# Patient Record
Sex: FEMALE | Race: BLACK OR AFRICAN AMERICAN | NOT HISPANIC OR LATINO | Employment: UNEMPLOYED | URBAN - METROPOLITAN AREA
[De-identification: names, ages, dates, MRNs, and addresses within clinical notes are randomized per-mention and may not be internally consistent; named-entity substitution may affect disease eponyms.]

---

## 2017-03-20 ENCOUNTER — TELEPHONE (OUTPATIENT)
Dept: ORTHOPEDICS | Facility: CLINIC | Age: 47
End: 2017-03-20

## 2017-03-20 NOTE — TELEPHONE ENCOUNTER
Spoke with patient and gave her to the appointment desk to schedule an appointment and update her information.

## 2017-03-20 NOTE — TELEPHONE ENCOUNTER
----- Message from Jocelynn Suazo sent at 3/20/2017  8:26 AM CDT -----  Pt is requesting a call from nurse to discuss an apt for her knee pain. Pt states she has MEDICAID.          Please call pt back at [phone]

## 2017-05-18 ENCOUNTER — OFFICE VISIT (OUTPATIENT)
Dept: OBSTETRICS AND GYNECOLOGY | Facility: CLINIC | Age: 47
End: 2017-05-18
Payer: MEDICAID

## 2017-05-18 ENCOUNTER — HOSPITAL ENCOUNTER (OUTPATIENT)
Dept: RADIOLOGY | Facility: HOSPITAL | Age: 47
Discharge: HOME | End: 2017-05-18
Attending: NURSE PRACTITIONER
Payer: MEDICAID

## 2017-05-18 VITALS
HEIGHT: 64 IN | WEIGHT: 196.19 LBS | BODY MASS INDEX: 33.49 KG/M2 | SYSTOLIC BLOOD PRESSURE: 116 MMHG | DIASTOLIC BLOOD PRESSURE: 72 MMHG

## 2017-05-18 DIAGNOSIS — Z12.31 ENCOUNTER FOR SCREENING MAMMOGRAM FOR BREAST CANCER: ICD-10-CM

## 2017-05-18 DIAGNOSIS — N92.6 IRREGULAR MENSES: ICD-10-CM

## 2017-05-18 DIAGNOSIS — Z01.419 ENCOUNTER FOR GYNECOLOGICAL EXAMINATION WITHOUT ABNORMAL FINDING: Primary | ICD-10-CM

## 2017-05-18 PROCEDURE — 77067 SCR MAMMO BI INCL CAD: CPT | Mod: 26,,, | Performed by: RADIOLOGY

## 2017-05-18 PROCEDURE — 88175 CYTOPATH C/V AUTO FLUID REDO: CPT

## 2017-05-18 PROCEDURE — 77067 SCR MAMMO BI INCL CAD: CPT | Mod: TC

## 2017-05-18 PROCEDURE — 99999 PR PBB SHADOW E&M-EST. PATIENT-LVL III: CPT | Mod: PBBFAC,,, | Performed by: NURSE PRACTITIONER

## 2017-05-18 PROCEDURE — 99386 PREV VISIT NEW AGE 40-64: CPT | Mod: S$PBB,,, | Performed by: NURSE PRACTITIONER

## 2017-05-18 NOTE — PROGRESS NOTES
"CC: Well woman exam    Tara Encinas is a 46 y.o. female  presents for well woman exam.  LMP: Patient's last menstrual period was 2017 (approximate)..    At times skip cycles off and on past 2 years started after having tubal pregnancy. She did loss her fallopian tube with the ectopic.  She would still like pregnancy.     Past Medical History:   Diagnosis Date    Tubal ectopic pregnancy      Past Surgical History:   Procedure Laterality Date     SECTION      x 3    EYE SURGERY      Left eye    FINGER SURGERY      right hand (index finger)    KNEE SURGERY      bilateral     Social History     Social History    Marital status: Single     Spouse name: N/A    Number of children: N/A    Years of education: N/A     Occupational History    Not on file.     Social History Main Topics    Smoking status: Never Smoker    Smokeless tobacco: Not on file    Alcohol use No    Drug use: No    Sexual activity: Yes     Partners: Male     Birth control/ protection: None     Other Topics Concern    Not on file     Social History Narrative    No narrative on file     Family History   Problem Relation Age of Onset    Diabetes Paternal Grandmother     Breast cancer Neg Hx     Cancer Neg Hx     Colon cancer Neg Hx     Ovarian cancer Neg Hx     Miscarriages / Stillbirths Neg Hx     Eclampsia Neg Hx     Hypertension Neg Hx      labor Neg Hx     Stroke Neg Hx      OB History      Para Term  AB TAB SAB Ectopic Multiple Living    6 3 3  3 2  1            /72  Ht 5' 4" (1.626 m)  Wt 89 kg (196 lb 3.2 oz)  LMP 2017 (Approximate)  BMI 33.68 kg/m2      ROS:  GENERAL: Denies weight gain or weight loss. Feeling well overall.   SKIN: Denies rash or lesions.   HEAD: Denies head injury or headache.   NODES: Denies enlarged lymph nodes.   CHEST: Denies chest pain or shortness of breath.   CARDIOVASCULAR: Denies palpitations or left sided chest pain.   ABDOMEN: No " abdominal pain, constipation, diarrhea, nausea, vomiting or rectal bleeding.   URINARY: No frequency, dysuria, hematuria, or burning on urination.  REPRODUCTIVE: See HPI.   BREASTS: The patient performs breast self-examination and denies pain, lumps, or nipple discharge.   HEMATOLOGIC: No easy bruisability or excessive bleeding.   MUSCULOSKELETAL: Denies joint pain or swelling.   NEUROLOGIC: Denies syncope or weakness.   PSYCHIATRIC: Denies depression, anxiety or mood swings.    PHYSICAL EXAM:  APPEARANCE: Well nourished, well developed, in no acute distress.  AFFECT: WNL, alert and oriented x 3  SKIN: No acne or hirsutism  NECK: Neck symmetric without masses or thyromegaly  NODES: No inguinal, cervical, axillary, or femoral lymph node enlargement  CHEST: Good respiratory effect  ABDOMEN: Soft.  No tenderness or masses.  No hepatosplenomegaly.  No hernias.  BREASTS: Symmetrical, no skin changes or visible lesions.  No palpable masses, nipple discharge bilaterally.  PELVIC: Normal external genitalia without lesions.  Normal hair distribution.  Adequate perineal body, normal urethral meatus.  Vagina moist and well rugated without lesions or discharge.  Cervix pink, without lesions, discharge or tenderness.  No significant cystocele or rectocele.  Bimanual exam shows uterus to be normal size, regular, mobile and nontender.  Adnexa without masses or tenderness.    EXTREMITIES: No edema.  Physical Exam    1. Encounter for gynecological examination without abnormal finding  Liquid-based pap smear, screening   2. Encounter for screening mammogram for breast cancer  Mammo Digital Screening Bilat with CAD   3. Irregular menses      AND PLAN:    Patient was counseled today on A.C.S. Pap guidelines and recommendations for yearly pelvic exams, mammograms and monthly self breast exams; to see her PCP for other health maintenance.     See GYN MD to discuss fertility issues   Prenatal vitamins

## 2017-05-18 NOTE — MR AVS SNAPSHOT
[facility] - OB/ GYN  [address] [place] [address]  Phone: [phone]  Fax: [phone]                  Tara Encinas   2017 10:30 AM   Office Visit    Description:  Female : 1970   Provider:  Nury Ho NP   Department:  [facility] - [facility]/ [facility]           Reason for Visit     Annual Exam     Menstrual Problem           Diagnoses this Visit        Comments    Encounter for gynecological examination without abnormal finding    -  Primary     Encounter for screening mammogram for breast cancer         Irregular menses                To Do List           Future Appointments        Provider Department Dept Phone    2017 12:45 PM [facility] MAMMO1-SCR [facility] [phone]    2017 10:45 AM Eric Velasco MD [facility] OB/ -[phone]      Goals (5 Years of Data)     None      Follow-Up and Disposition     Return in about 1 year (around 2018).      [facility]     [facility] Nurse Care Line -  Assistance  Unless otherwise directed by your provider, please contact [facility], our nurse care line that is available for  assistance.     Registered nurses in the [facility] provide: appointment scheduling, clinical advisement, health education, and other advisory services.  Call: [phone] (toll free)               Medications           Message regarding Medications     Verify the changes and/or additions to your medication regime listed below are the same as discussed with your clinician today.  If any of these changes or additions are incorrect, please notify your healthcare provider.             Verify that the below list of medications is an accurate representation of the medications you are currently taking.  If none reported, the list may be blank. If incorrect, please contact your healthcare provider. Carry this list with you in case of emergency.                Clinical Reference Information           Your Vitals Were   "   BP Height Weight Last Period BMI    116/72 5' 4" (1.626 m) 89 kg (196 lb 3.2 oz) 04/26/2017 (Approximate) 33.68 kg/m2      Blood Pressure          Most Recent Value    BP  116/72      Allergies as of 5/18/2017     No Known Allergies      Immunizations Administered on Date of Encounter - 5/18/2017     None      Orders Placed During Today's Visit      Normal Orders This Visit    Liquid-based pap smear, screening     Future Labs/Procedures Expected by Expires    Mammo Digital Screening Bilat with CAD  5/18/2017 7/18/2018      Language Assistance Services     ATTENTION: Language assistance services are available, free of charge. Please call [phone].      ATENCIÓN: Si habla sandra, tiene a franklin disposición servicios gratuitos de asistencia lingüística. Llame al [phone].     CHÚ Ý: N?u b?n nói Ti?ng Vi?t, có các d?ch v? h? tr? ngôn ng? mi?n phí dành cho b?n. G?i s? [phone].         [facility] complies with applicable Federal civil rights laws and does not discriminate on the basis of race, color, national origin, age, disability, or sex.        "

## 2017-05-26 ENCOUNTER — PATIENT MESSAGE (OUTPATIENT)
Dept: OBSTETRICS AND GYNECOLOGY | Facility: CLINIC | Age: 47
End: 2017-05-26

## 2017-06-07 ENCOUNTER — OFFICE VISIT (OUTPATIENT)
Dept: OBSTETRICS AND GYNECOLOGY | Facility: CLINIC | Age: 47
End: 2017-06-07
Payer: MEDICAID

## 2017-06-07 VITALS
SYSTOLIC BLOOD PRESSURE: 122 MMHG | WEIGHT: 192.25 LBS | DIASTOLIC BLOOD PRESSURE: 72 MMHG | HEIGHT: 59 IN | BODY MASS INDEX: 38.76 KG/M2

## 2017-06-07 DIAGNOSIS — N94.6 DYSMENORRHEA: ICD-10-CM

## 2017-06-07 DIAGNOSIS — Z87.59 HISTORY OF ECTOPIC PREGNANCY: ICD-10-CM

## 2017-06-07 DIAGNOSIS — E66.9 OBESITY (BMI 35.0-39.9 WITHOUT COMORBIDITY): ICD-10-CM

## 2017-06-07 DIAGNOSIS — N91.5 OLIGOMENORRHEA: ICD-10-CM

## 2017-06-07 PROCEDURE — 99212 OFFICE O/P EST SF 10 MIN: CPT | Mod: PBBFAC,PO | Performed by: OBSTETRICS & GYNECOLOGY

## 2017-06-07 PROCEDURE — 99213 OFFICE O/P EST LOW 20 MIN: CPT | Mod: S$PBB,,, | Performed by: OBSTETRICS & GYNECOLOGY

## 2017-06-07 PROCEDURE — 99999 PR PBB SHADOW E&M-EST. PATIENT-LVL II: CPT | Mod: PBBFAC,,, | Performed by: OBSTETRICS & GYNECOLOGY

## 2017-06-07 RX ORDER — FLUTICASONE PROPIONATE 220 UG/1
AEROSOL, METERED RESPIRATORY (INHALATION)
COMMUNITY
Start: 2017-04-19 | End: 2017-07-31

## 2017-06-07 NOTE — LETTER
June 7, 2017      Nury Ho, NP  [address] [place]  [place] [address]           [facility] - OB/ GYN  [address]  [place] [address]  Phone: [phone]  Fax: [phone]          Patient: Tara Encinas   MR Number: [id number]   YOB: 1970   Date of Visit: 6/7/2017       Dear Nury Ho:    Thank you for referring Tara Encinas to me for evaluation. Attached you will find relevant portions of my assessment and plan of care.    If you have questions, please do not hesitate to call me. I look forward to following Tara Encinas along with you.    Sincerely,    Eric Velasco MD    Enclosure  CC:  No Recipients    If you would like to receive this communication electronically, please contact [email or web address] or [phone] to request more information on [facility] access.    For providers and/or their staff who would like to refer a patient to [facility], please contact us through our one-stop-shop provider referral line, [facility], at [phone].    If you feel you have received this communication in error or would no longer like to receive these types of communications, please e-mail [email or web address]

## 2017-06-07 NOTE — PROGRESS NOTES
"Tara Encinas is a 46 y.o.  who presents for   Chief Complaint   Patient presents with    Infertility     c/o trying to conceive x 7 months; reports hx of ectopic pregnancy 2 yrs ago (emerg surgery done at ).      Patient's last menstrual period was 2017 (exact date).     Periods are not regular q month, "late a lot."   + dysmenorrhea, heavy with clots.   - no dyspareunia    Pt is sexually active - mut monog - uses nothing for contraception.   - her SO only has 1 child and wants another    No hx of abnormal paps. Last pap was normal on 17.  Last mammogram was normal on 17.    Past Medical History:   Diagnosis Date    Asthma     Tubal ectopic pregnancy        Past Surgical History:   Procedure Laterality Date     SECTION      x 3    ECTOPIC PREGNANCY SURGERY      EYE SURGERY      Left eye    FINGER SURGERY      right hand (index finger)    KNEE SURGERY      bilateral       Current Outpatient Prescriptions   Medication Sig Dispense Refill    FLOVENT  mcg/actuation inhaler        No current facility-administered medications for this visit.           Review of patient's allergies indicates:  No Known Allergies    .  Family History   Problem Relation Age of Onset    Diabetes Paternal Grandmother     Breast cancer Neg Hx     Colon cancer Neg Hx     Ovarian cancer Neg Hx     Thrombosis Neg Hx        Social History   Substance Use Topics    Smoking status: Never Smoker    Smokeless tobacco: Not on file    Alcohol use Yes      Comment: socially       /72   Ht 4' 11" (1.499 m)   Wt 87.2 kg (192 lb 3.9 oz)   LMP 2017 (Exact Date)   BMI 38.83 kg/m²     ROS:  GENERAL: No acute complaints.   BREASTS: No lumps, tenderness, discharge.  GI: No nausea, vomiting, melena, hematochezia.  : No dysuria, hematuria. No significant urinary incontinence.      Nl PE on 17 w Ms [place]    GEN:  Pleasant obese lady in no acute distress.  HEAD and NECK: " Normocephalic. Normal thyroid to inspection   SKIN: Min fascial hirsutism or acne    IMPRESSION: obesity, suboptimal fertility, dysmenorrhea, hx of ectopic      COUNSELING:  - Long talk re age, decreasing fecundity, increased risks of chromosomal abnormalities, and her increased risk of an ectopic again  - discussed pos adhesions from her prior 3 C/S's and her salpingectomy w her ectopic (in 2015)  - discussed cost and probable lack of coverage with her current insurance and rec she check before any big W/U  - also discussed her obesity and increased risk of an/oligo-ovulation and decreased fertility    - talk re pt's weight and the other health implications associated w obesity (increased risks of thrombosis, DM, HTN, renal disease, lipid issues, etc) - encourage regular exercise (30-60 min 5 times/wk) but stressed the importance of diet in actual weight control and recommend Weight Watchers, Turning [employer] program or any other organized program along with increased activity/exercise. Suggest a target of 5-10 % wt reduction over 6-12 months as a goal.    - trial of premenstrual aleve for the hypermen and dysmenorrhea    PLAN: consider her options - work on wt !

## 2017-07-18 ENCOUNTER — TELEPHONE (OUTPATIENT)
Dept: OBSTETRICS AND GYNECOLOGY | Facility: CLINIC | Age: 47
End: 2017-07-18

## 2017-07-18 NOTE — TELEPHONE ENCOUNTER
"Pt c/o bleeding with clots and cramps since 6/20/17, was seen by PCP today and rx'd "something to stop the bleeding until I can get in to see the doctor."  Appt made for 7/31/17 at 11am with Nury per pt request.  Pt voiced understanding.  ARUN, LPN  "

## 2017-07-18 NOTE — TELEPHONE ENCOUNTER
----- Message from Neris Ortiz sent at 7/18/2017  8:16 AM CDT -----  Contact: Pt   States she's calling to be worked in today with someone for an ongoing cycle says for over a mnth and can be reached at [phone]//thanks/dbw

## 2017-07-31 ENCOUNTER — LAB VISIT (OUTPATIENT)
Dept: LAB | Facility: HOSPITAL | Age: 47
End: 2017-07-31
Attending: NURSE PRACTITIONER
Payer: MEDICAID

## 2017-07-31 ENCOUNTER — OFFICE VISIT (OUTPATIENT)
Dept: OBSTETRICS AND GYNECOLOGY | Facility: CLINIC | Age: 47
End: 2017-07-31
Payer: MEDICAID

## 2017-07-31 VITALS
HEIGHT: 59 IN | BODY MASS INDEX: 39.11 KG/M2 | WEIGHT: 194 LBS | DIASTOLIC BLOOD PRESSURE: 76 MMHG | SYSTOLIC BLOOD PRESSURE: 124 MMHG

## 2017-07-31 DIAGNOSIS — N92.1 MENORRHAGIA WITH IRREGULAR CYCLE: ICD-10-CM

## 2017-07-31 DIAGNOSIS — N92.1 MENORRHAGIA WITH IRREGULAR CYCLE: Primary | ICD-10-CM

## 2017-07-31 LAB
BASOPHILS # BLD AUTO: 0.03 K/UL
BASOPHILS NFR BLD: 0.3 %
DIFFERENTIAL METHOD: ABNORMAL
EOSINOPHIL # BLD AUTO: 0.3 K/UL
EOSINOPHIL NFR BLD: 3.2 %
ERYTHROCYTE [DISTWIDTH] IN BLOOD BY AUTOMATED COUNT: 16.4 %
HCT VFR BLD AUTO: 24 %
HGB BLD-MCNC: 7.1 G/DL
LYMPHOCYTES # BLD AUTO: 3 K/UL
LYMPHOCYTES NFR BLD: 28.7 %
MCH RBC QN AUTO: 23.2 PG
MCHC RBC AUTO-ENTMCNC: 29.6 G/DL
MCV RBC AUTO: 78 FL
MONOCYTES # BLD AUTO: 0.6 K/UL
MONOCYTES NFR BLD: 5.7 %
NEUTROPHILS # BLD AUTO: 6.4 K/UL
NEUTROPHILS NFR BLD: 61.4 %
PLATELET # BLD AUTO: 655 K/UL
PMV BLD AUTO: 9.3 FL
RBC # BLD AUTO: 3.06 M/UL
TSH SERPL DL<=0.005 MIU/L-ACNC: 3.53 UIU/ML
WBC # BLD AUTO: 10.47 K/UL

## 2017-07-31 PROCEDURE — 85025 COMPLETE CBC W/AUTO DIFF WBC: CPT

## 2017-07-31 PROCEDURE — 99214 OFFICE O/P EST MOD 30 MIN: CPT | Mod: 25,S$PBB,, | Performed by: NURSE PRACTITIONER

## 2017-07-31 PROCEDURE — 58100 BIOPSY OF UTERUS LINING: CPT | Mod: S$PBB,,, | Performed by: NURSE PRACTITIONER

## 2017-07-31 PROCEDURE — 84443 ASSAY THYROID STIM HORMONE: CPT

## 2017-07-31 PROCEDURE — 99999 PR PBB SHADOW E&M-EST. PATIENT-LVL III: CPT | Mod: PBBFAC,,, | Performed by: NURSE PRACTITIONER

## 2017-07-31 PROCEDURE — 36415 COLL VENOUS BLD VENIPUNCTURE: CPT | Mod: PO

## 2017-07-31 PROCEDURE — 88305 TISSUE EXAM BY PATHOLOGIST: CPT | Performed by: PATHOLOGY

## 2017-07-31 PROCEDURE — 88305 TISSUE EXAM BY PATHOLOGIST: CPT | Mod: 26,,, | Performed by: PATHOLOGY

## 2017-07-31 RX ORDER — OMEPRAZOLE 40 MG/1
40 CAPSULE, DELAYED RELEASE ORAL DAILY
COMMUNITY
End: 2018-02-09

## 2017-07-31 NOTE — PROGRESS NOTES
"  Tara Encinas is a 46 y.o. female  presents for heavy menses.  States she has been bleeding since in  with clots.  Saw her pcp Dr. Eisenberg on 17 and was given provera - which she is still taking and it has helped the bleeding.  Now just some spotting at times.   Pt denies sexual activity since in May.     LMP: No LMP recorded..      Past Medical History:   Diagnosis Date    Asthma     Tubal ectopic pregnancy      Past Surgical History:   Procedure Laterality Date     SECTION      x 3    ECTOPIC PREGNANCY SURGERY      EYE SURGERY      Left eye    FINGER SURGERY      right hand (index finger)    KNEE SURGERY      bilateral     Social History     Social History    Marital status: Single     Spouse name: N/A    Number of children: N/A    Years of education: N/A     Occupational History    Not on file.     Social History Main Topics    Smoking status: Never Smoker    Smokeless tobacco: Never Used    Alcohol use Yes      Comment: socially    Drug use: No    Sexual activity: Yes     Partners: Male     Birth control/ protection: None     Other Topics Concern    Not on file     Social History Narrative    No narrative on file     Family History   Problem Relation Age of Onset    Diabetes Paternal Grandmother     Breast cancer Neg Hx     Colon cancer Neg Hx     Ovarian cancer Neg Hx     Thrombosis Neg Hx      OB History      Para Term  AB Living    6 3 3   3 3    SAB TAB Ectopic Multiple Live Births      2 1   3          /76   Ht 4' 11" (1.499 m)   Wt 88 kg (194 lb)   BMI 39.18 kg/m²       ROS:  Per hpi    PHYSICAL EXAM:  APPEARANCE: Well nourished, well developed, in no acute distress.  AFFECT: WNL, alert and oriented x 3  PELVIC: Normal external genitalia without lesions.  Normal hair distribution.  Adequate perineal body, normal urethral meatus.  Vagina moist and well rugated without lesions, creamy tan discharge.  Cervix pink, without lesions, " discharge or tenderness.  No significant cystocele or rectocele.  Bimanual exam shows uterus to be normal size, regular, mobile and nontender.  Adnexa without masses or tenderness.    EXTREMITIES: No edema.  Physical Exam       Procedure Details    A speculum was placed and cervix was prepped with betadine. A sharp tenaculum was applied to the cervix for stabilization. A pipelle was used to sample the endometrium. Uterus sounded to 10. Sample was sent for pathologic examination.    A copious amount of tissue was obtained. Patient tolerated procedure well.        Complications:  None          1. Menorrhagia with irregular cycle  US Pelvis Comp with Transvag NON-OB (xpd    Tissue Specimen To Pathology, Obstetrics/Gynecology    Endometrial biopsy    CBC auto differential    TSH    POCT urine pregnancy    AND PLAN:    upt was negative in office proceed with EMB  Set up pelvic us and labs  Fu with Dr. Velasco   The patient was advised to call for any fever or for prolonged or severe pain or bleeding. She was advised to use OTC analgesics as needed for mild to moderate pain. Patient instructed to call office for results in 1 to 2 weeks.

## 2017-08-01 DIAGNOSIS — D50.0 IRON DEFICIENCY ANEMIA DUE TO CHRONIC BLOOD LOSS: Primary | ICD-10-CM

## 2017-08-01 DIAGNOSIS — N92.1 MENORRHAGIA WITH IRREGULAR CYCLE: ICD-10-CM

## 2017-08-01 RX ORDER — FERROUS SULFATE 325(65) MG
325 TABLET, DELAYED RELEASE (ENTERIC COATED) ORAL 2 TIMES DAILY
Qty: 60 TABLET | Refills: 3 | Status: SHIPPED | OUTPATIENT
Start: 2017-08-01 | End: 2018-04-12

## 2017-08-07 ENCOUNTER — PATIENT MESSAGE (OUTPATIENT)
Dept: OBSTETRICS AND GYNECOLOGY | Facility: CLINIC | Age: 47
End: 2017-08-07

## 2017-08-08 ENCOUNTER — TELEPHONE (OUTPATIENT)
Dept: RADIOLOGY | Facility: HOSPITAL | Age: 47
End: 2017-08-08

## 2017-08-09 ENCOUNTER — TELEPHONE (OUTPATIENT)
Dept: OBSTETRICS AND GYNECOLOGY | Facility: CLINIC | Age: 47
End: 2017-08-09

## 2017-08-09 ENCOUNTER — HOSPITAL ENCOUNTER (OUTPATIENT)
Dept: RADIOLOGY | Facility: HOSPITAL | Age: 47
Discharge: HOME | End: 2017-08-09
Attending: NURSE PRACTITIONER
Payer: MEDICAID

## 2017-08-09 DIAGNOSIS — N92.1 MENORRHAGIA WITH IRREGULAR CYCLE: ICD-10-CM

## 2017-08-09 NOTE — TELEPHONE ENCOUNTER
----- Message from Radha Sanchez sent at 8/9/2017 10:42 AM CDT -----  Patient would like to have a call back from the nurse she had a pelvic ultrasound scheduled today  didn't drink enough water, it was rescheduled. Patient is complaining of heavy bleeding with clots and feels weak, changing pads every 15 minutes and would like something called in to the pharmacy to control the bleeding, please advise patient she can be reached at [phone]// thanks//dd

## 2017-08-09 NOTE — TELEPHONE ENCOUNTER
"Pt c/o bleeding, changing pad every 15-20 minutes, feeling weak, "but not like I need to go to the hospital."  Pt states PCP rx'd provera with no relief.  U/S for today was r/s "because I didn't drink enough water."  Pt requests medication to stop bleeding.  Dr. Velasco spoke to pt and she agreed to go to ER at [facility] now for evaluation.  ARUN, LPN  "

## 2017-08-10 ENCOUNTER — TELEPHONE (OUTPATIENT)
Dept: OBSTETRICS AND GYNECOLOGY | Facility: CLINIC | Age: 47
End: 2017-08-10

## 2017-08-10 NOTE — TELEPHONE ENCOUNTER
----- Message from Vanda Humphries sent at 8/8/2017  3:42 PM CDT -----  Contact: Pt  Pt called and stated she needed to speak to the nurse. She stated she has started bleeding again and needs something called in to her pharmacy.    [facility] [address] [place] [address] [address] [facility]  [address] [facility] [address]  Phone: [phone] Fax: [phone]    She can be reached at [phone] (home)     Thanks,  TF

## 2017-08-15 ENCOUNTER — TELEPHONE (OUTPATIENT)
Dept: RADIOLOGY | Facility: HOSPITAL | Age: 47
End: 2017-08-15

## 2017-08-22 ENCOUNTER — TELEPHONE (OUTPATIENT)
Dept: RADIOLOGY | Facility: HOSPITAL | Age: 47
End: 2017-08-22

## 2017-08-23 ENCOUNTER — PATIENT MESSAGE (OUTPATIENT)
Dept: OBSTETRICS AND GYNECOLOGY | Facility: CLINIC | Age: 47
End: 2017-08-23

## 2017-08-23 ENCOUNTER — HOSPITAL ENCOUNTER (OUTPATIENT)
Dept: RADIOLOGY | Facility: HOSPITAL | Age: 47
Discharge: HOME | End: 2017-08-23
Attending: NURSE PRACTITIONER
Payer: MEDICAID

## 2017-08-23 PROCEDURE — 76856 US EXAM PELVIC COMPLETE: CPT | Mod: 26,,, | Performed by: RADIOLOGY

## 2017-08-23 PROCEDURE — 76856 US EXAM PELVIC COMPLETE: CPT | Mod: TC,PO

## 2017-08-23 PROCEDURE — 76830 TRANSVAGINAL US NON-OB: CPT | Mod: 26,,, | Performed by: RADIOLOGY

## 2017-12-19 ENCOUNTER — TELEPHONE (OUTPATIENT)
Dept: OBSTETRICS AND GYNECOLOGY | Facility: CLINIC | Age: 47
End: 2017-12-19

## 2017-12-19 DIAGNOSIS — N92.6 IRREGULAR MENSES: ICD-10-CM

## 2017-12-19 DIAGNOSIS — N92.1 MENORRHAGIA WITH IRREGULAR CYCLE: Primary | ICD-10-CM

## 2017-12-19 DIAGNOSIS — N94.6 DYSMENORRHEA: ICD-10-CM

## 2017-12-19 RX ORDER — MEDROXYPROGESTERONE ACETATE 10 MG/1
TABLET ORAL
Qty: 30 TABLET | Refills: 0 | Status: SHIPPED | OUTPATIENT
Start: 2017-12-19 | End: 2018-01-30 | Stop reason: SDUPTHER

## 2017-12-19 NOTE — PROGRESS NOTES
Failed to show for her appt with Dr. Velasco, calling today still with bleeding, provera has helped in past, will send in for one month and she needs to make and keep appt with gyn MD - pt was instructed on these orders by Melvin LUNA

## 2017-12-19 NOTE — TELEPHONE ENCOUNTER
Spoke with pt about ultrasound follow up appt. appt has been scheduled for 2/9/18. Pt requested to be put on wait list. She has been added and requested for something to be called in for bleeding until appt. Spoke with Ms Nury and she called in a rx. Informed pt that medication has been called in. Pt verbalized understanding.

## 2017-12-19 NOTE — TELEPHONE ENCOUNTER
----- Message from Leni Hayward sent at 12/19/2017  9:25 AM CST -----  Contact: pt   Call pt regarding her ultrasound results. Pt states that she left numerous of message and nobody called her back with her results.   [phone] (home)

## 2018-01-30 ENCOUNTER — TELEPHONE (OUTPATIENT)
Dept: OBSTETRICS AND GYNECOLOGY | Facility: CLINIC | Age: 48
End: 2018-01-30

## 2018-01-30 DIAGNOSIS — N92.1 MENORRHAGIA WITH IRREGULAR CYCLE: ICD-10-CM

## 2018-01-30 DIAGNOSIS — N92.6 IRREGULAR MENSES: ICD-10-CM

## 2018-01-30 DIAGNOSIS — N94.6 DYSMENORRHEA: ICD-10-CM

## 2018-01-30 RX ORDER — MEDROXYPROGESTERONE ACETATE 10 MG/1
TABLET ORAL
Qty: 30 TABLET | Refills: 0 | Status: SHIPPED | OUTPATIENT
Start: 2018-01-30 | End: 2018-02-09 | Stop reason: ALTCHOICE

## 2018-01-30 NOTE — TELEPHONE ENCOUNTER
Pt. States that she is still bleeding very heavily. She sees Dr. Acosta on 2/9/18 but wants to know if you will call in Rx again to help with the bleeding until she sees him.

## 2018-01-30 NOTE — TELEPHONE ENCOUNTER
----- Message from Padmini Magana sent at 1/30/2018  1:00 PM CST -----  Contact: [phone]   Pt would like to consult with the nurse, personal.  Please call back at [phone].  Thx-AH

## 2018-02-09 ENCOUNTER — OFFICE VISIT (OUTPATIENT)
Dept: OBSTETRICS AND GYNECOLOGY | Facility: CLINIC | Age: 48
End: 2018-02-09
Payer: MEDICAID

## 2018-02-09 VITALS
WEIGHT: 194.88 LBS | DIASTOLIC BLOOD PRESSURE: 92 MMHG | SYSTOLIC BLOOD PRESSURE: 134 MMHG | BODY MASS INDEX: 39.29 KG/M2 | HEIGHT: 59 IN

## 2018-02-09 DIAGNOSIS — D25.9 UTERINE LEIOMYOMA, UNSPECIFIED LOCATION: Primary | ICD-10-CM

## 2018-02-09 PROCEDURE — 99999 PR PBB SHADOW E&M-EST. PATIENT-LVL II: CPT | Mod: PBBFAC,,, | Performed by: OBSTETRICS & GYNECOLOGY

## 2018-02-09 PROCEDURE — 3008F BODY MASS INDEX DOCD: CPT | Mod: ,,, | Performed by: OBSTETRICS & GYNECOLOGY

## 2018-02-09 PROCEDURE — 99212 OFFICE O/P EST SF 10 MIN: CPT | Mod: PBBFAC,PO | Performed by: OBSTETRICS & GYNECOLOGY

## 2018-02-09 PROCEDURE — 81025 URINE PREGNANCY TEST: CPT | Mod: PBBFAC,PO | Performed by: OBSTETRICS & GYNECOLOGY

## 2018-02-09 PROCEDURE — 99213 OFFICE O/P EST LOW 20 MIN: CPT | Mod: S$PBB,,, | Performed by: OBSTETRICS & GYNECOLOGY

## 2018-02-09 RX ORDER — MELOXICAM 15 MG/1
TABLET ORAL
COMMUNITY
Start: 2018-02-03 | End: 2018-04-12

## 2018-02-09 RX ORDER — BACLOFEN 20 MG/1
TABLET ORAL
COMMUNITY
Start: 2018-02-03 | End: 2018-04-12

## 2018-02-09 RX ORDER — MEDROXYPROGESTERONE ACETATE 150 MG/ML
150 INJECTION, SUSPENSION INTRAMUSCULAR
Status: COMPLETED | OUTPATIENT
Start: 2018-02-09 | End: 2019-02-01

## 2018-02-09 RX ORDER — PREDNISONE 10 MG/1
TABLET ORAL
COMMUNITY
Start: 2018-02-03 | End: 2018-04-12

## 2018-02-09 RX ADMIN — MEDROXYPROGESTERONE ACETATE 150 MG: 150 INJECTION, SUSPENSION INTRAMUSCULAR at 12:02

## 2018-02-09 NOTE — PROGRESS NOTES
Two patient identifiers verified. Pt notified to wait in clinic for 15 minutes after receiving injection, pt verbalized understanding. 150 mg of Depo-Provera administered IM to patient right ventrogluteal. Pt tolerated well. Next injection scheduled for 05/04/18  Along with follow up visit with Dr. Acosta. Pt verbalized understanding.

## 2018-02-09 NOTE — PROGRESS NOTES
Subjective:       Patient ID: Tara Encinas is a 47 y.o. female.    Chief Complaint:  Follow-up (irregular bleeding)      History of Present Illness  HPI  Uterine Fibroids  Patient presents with uterine fibroids. Periods are irregular, lasting up to 45 days. Dysmenorrhea:moderate, occurring throughout menses. Cyclic symptoms include none.  Cycles are heavy in flow.  She does pass clots.  Pt was evaluated for this last summer, including labs, ultrasound (see below), and EMB (benign).  Pt was lost to follow up until today.  Pt reports that symptoms have continued to worsen and would like to discuss options.  Pt is engaged and would prefer conservative management for now.        GYN & OB History  No LMP recorded (within months).   Date of Last Pap: 2017    OB History    Para Term  AB Living   6 3 3   3 3   SAB TAB Ectopic Multiple Live Births     2 1   3      # Outcome Date GA Lbr Cameron/2nd Weight Sex Delivery Anes PTL Lv   6 Ectopic            5 Term      CS-Unspec      4 Term      CS-Unspec      3 TAB            2 Term      CS-Unspec      1 TAB                   Review of Systems  Review of Systems   Constitutional: Positive for fatigue. Negative for activity change, appetite change, fever and unexpected weight change.   Respiratory: Negative for shortness of breath.    Cardiovascular: Negative for chest pain, palpitations and leg swelling.   Gastrointestinal: Positive for abdominal pain. Negative for bloating, blood in stool, constipation, diarrhea, nausea and vomiting.   Genitourinary: Positive for dyspareunia, menorrhagia, menstrual problem, pelvic pain and dysmenorrhea. Negative for dysuria, flank pain, frequency, genital sores, hematuria, vaginal bleeding, vaginal discharge, vaginal pain, urinary incontinence and vaginal odor.   Musculoskeletal: Positive for back pain.   Neurological: Negative for syncope and headaches.           Objective:    Physical Exam:   Constitutional: She is  oriented to person, place, and time. She appears well-developed and well-nourished. No distress.       Cardiovascular: Normal rate, regular rhythm and normal heart sounds.     Pulmonary/Chest: Effort normal and breath sounds normal.        Abdominal: Soft. Bowel sounds are normal. She exhibits no distension. There is no tenderness.     Genitourinary: Vagina normal. Pelvic exam was performed with patient supine. There is no rash, tenderness, lesion or injury on the right labia. There is no rash, tenderness, lesion or injury on the left labia. Uterus is enlarged and hosting fibroids. Uterus is not deviated and not tender. Cervix is normal. Right adnexum displays no mass, no tenderness and no fullness. Left adnexum displays no mass, no tenderness and no fullness. No erythema, tenderness or bleeding in the vagina. No foreign body in the vagina. No signs of injury around the vagina. No vaginal discharge found. Cervix exhibits no motion tenderness, no discharge and no friability.        Uterus Size: 14 cm   Musculoskeletal: Normal range of motion and moves all extremeties. She exhibits no edema or tenderness.       Neurological: She is alert and oriented to person, place, and time.    Skin: Skin is warm and dry.    Psychiatric: She has a normal mood and affect. Her behavior is normal. Thought content normal.          Pelvic US 8/17: The uterus measures 12.3 x 5.0 x 4.7 cm.  There are at least 3 intramural fibroids present near the fundus with the largest measuring up to 3.1 cm.  Incidental note made of several small nabothian cysts.  The endometrial thickness is within normal limits for premenopausal female measuring 10 mm.   Right ovary is normal and measures 2.5 x 1.1 x 2.2 cm.  Blood flow appears normal.  Left ovary is normal and measures 3.9 x 1.5 x 1.1 cm.  Blood flow appears normal.  There is no free pelvic fluid.     Assessment:        1. Uterine leiomyoma, unspecified location             Plan:      Uterine  leiomyoma, unspecified location  -     POCT urine pregnancy  -     medroxyPROGESTERone (DEPO-PROVERA) syringe 150 mg; Inject 1 mL (150 mg total) into the muscle every 3 (three) months.  -     Pt was counseled on fibroids, including common signs and symptoms.  Symptoms are highly disruptive and have led to anemia.  Treatment options were reviewed with pt, including medical vs fibroid embolization vs myomectomy vs hysterectomy.  Pt voiced understanding and is unsure about desire for future childbearing and recently started a new job.  Pt prefers non-surgical management at this time.   Desires to proceed with Depo Provera.  Medication dosing, side-effects, risks, and benefits were discussed.      Follow-up in about 3 months (around 5/9/2018).

## 2018-04-02 ENCOUNTER — TELEPHONE (OUTPATIENT)
Dept: OBSTETRICS AND GYNECOLOGY | Facility: CLINIC | Age: 48
End: 2018-04-02

## 2018-04-02 NOTE — TELEPHONE ENCOUNTER
----- Message from Vanda Humphries sent at 4/2/2018 10:38 AM CDT -----  Contact: Pt  Pt called and stated she needed to speak to the nurse. She stated that she needs to discuss the tumor she has. She can be reached at [phone].    Thanks,  TF

## 2018-04-02 NOTE — TELEPHONE ENCOUNTER
Pt states she was recently diagnosed with fibroids, with intercourse she is having pain and feels a bursting feeling or popping sensation upon entering, also burning with urination. Pt states she has not had intercourse in a month and this feeling is new. Concerned that fibroids are getting larger but wants to wait to see Dr. Acosta once off of vacation.       Advised pt to avoid intercourse at this time as that is the only thing that causes the pain. Any other recommendations.

## 2018-04-03 NOTE — TELEPHONE ENCOUNTER
I am out of the office this week and unable to call pts.  I recommend that pt make an appointment to discuss these issues when I return.

## 2018-04-09 ENCOUNTER — TELEPHONE (OUTPATIENT)
Dept: OBSTETRICS AND GYNECOLOGY | Facility: CLINIC | Age: 48
End: 2018-04-09

## 2018-04-11 ENCOUNTER — TELEPHONE (OUTPATIENT)
Dept: OBSTETRICS AND GYNECOLOGY | Facility: CLINIC | Age: 48
End: 2018-04-11

## 2018-04-11 NOTE — TELEPHONE ENCOUNTER
----- Message from Dedra Natarajan sent at 4/11/2018 12:41 PM CDT -----  Contact: pt  Pt has rec'd two different messages regarding the location of her appt tomorrow.  Pt would like to confirm her appt at [facility] in the morning.

## 2018-04-11 NOTE — TELEPHONE ENCOUNTER
Spoke with pt. Confirmed her appt tomorrow is for the [facility] location. Pt verbalized understanding.

## 2018-04-12 ENCOUNTER — OFFICE VISIT (OUTPATIENT)
Dept: OBSTETRICS AND GYNECOLOGY | Facility: CLINIC | Age: 48
End: 2018-04-12
Payer: MEDICAID

## 2018-04-12 VITALS
HEIGHT: 59 IN | SYSTOLIC BLOOD PRESSURE: 110 MMHG | DIASTOLIC BLOOD PRESSURE: 76 MMHG | WEIGHT: 192.88 LBS | BODY MASS INDEX: 38.88 KG/M2

## 2018-04-12 DIAGNOSIS — D25.9 UTERINE LEIOMYOMA, UNSPECIFIED LOCATION: Primary | ICD-10-CM

## 2018-04-12 PROCEDURE — 99212 OFFICE O/P EST SF 10 MIN: CPT | Mod: S$PBB,,, | Performed by: OBSTETRICS & GYNECOLOGY

## 2018-04-12 PROCEDURE — 99999 PR PBB SHADOW E&M-EST. PATIENT-LVL III: CPT | Mod: PBBFAC,,, | Performed by: OBSTETRICS & GYNECOLOGY

## 2018-04-12 PROCEDURE — 99213 OFFICE O/P EST LOW 20 MIN: CPT | Mod: PBBFAC | Performed by: OBSTETRICS & GYNECOLOGY

## 2018-04-12 RX ORDER — NAPROXEN SODIUM 550 MG/1
550 TABLET ORAL 2 TIMES DAILY WITH MEALS
Qty: 30 TABLET | Refills: 1 | Status: SHIPPED | OUTPATIENT
Start: 2018-04-12 | End: 2019-03-07

## 2018-04-12 RX ORDER — KETOROLAC TROMETHAMINE 10 MG/1
10 TABLET, FILM COATED ORAL
COMMUNITY
Start: 2018-02-27 | End: 2019-03-07

## 2018-04-12 RX ORDER — PHENYLEPHRINE HYDROCHLORIDE 25 MG/ML
1 SOLUTION/ DROPS OPHTHALMIC
COMMUNITY
Start: 2016-09-30 | End: 2019-03-07

## 2018-04-12 RX ORDER — LORATADINE 10 MG/1
TABLET ORAL
COMMUNITY
Start: 2018-02-27 | End: 2019-02-01

## 2018-04-12 RX ORDER — FLUTICASONE PROPIONATE 50 MCG
SPRAY, SUSPENSION (ML) NASAL
COMMUNITY
Start: 2018-02-27 | End: 2019-02-01

## 2018-04-12 NOTE — PROGRESS NOTES
Subjective:       Patient ID: Tara Encinas is a 47 y.o. female.    Chief Complaint:  Menstrual Problem; Cramping; and Painful [place]      History of Present Illness  HPI  Pt is here for follow up.  reports that she has been noting persistent light vaginal bleeding since Depo injection, cramping, and pain during intercourse.  Would like to discuss options.    GYN & OB History  No LMP recorded (lmp unknown).   Date of Last Pap: 2017    OB History    Para Term  AB Living   6 3 3   3 3   SAB TAB Ectopic Multiple Live Births     2 1   3      # Outcome Date GA Lbr Cameron/2nd Weight Sex Delivery Anes PTL Lv   6 Ectopic            5 Term      CS-Unspec      4 Term      CS-Unspec      3 TAB            2 Term      CS-Unspec      1 TAB                   Review of Systems  Review of Systems   Constitutional: Negative for activity change, appetite change, fatigue, fever and unexpected weight change.   Respiratory: Negative for shortness of breath.    Cardiovascular: Negative for chest pain.   Gastrointestinal: Positive for abdominal pain. Negative for bloating, blood in stool, constipation, diarrhea, nausea and vomiting.   Genitourinary: Positive for dyspareunia, menorrhagia, menstrual problem, pelvic pain, vaginal bleeding and dysmenorrhea. Negative for vaginal discharge, vaginal pain and vaginal odor.   Musculoskeletal: Negative for back pain.   Neurological: Negative for syncope and headaches.           Objective:    Physical Exam:   Constitutional: She is oriented to person, place, and time. She appears well-developed and well-nourished.               Genitourinary:   Genitourinary Comments: Pt declines exam today               Neurological: She is alert and oriented to person, place, and time.     Psychiatric: She has a normal mood and affect. Her behavior is normal. Thought content normal.          Assessment:        1. Uterine leiomyoma, unspecified location             Plan:      Uterine  leiomyoma, unspecified location  -     naproxen sodium (ANAPROX) 550 MG tablet; Take 1 tablet (550 mg total) by mouth 2 (two) times daily with meals.  Dispense: 30 tablet; Refill: 1  -     Pt was again counseled on fibroids and on side-effects of Depo Provera use.  Pt voiced understanding and desires to continue with Depo Provera for now.  Will add Anaprox prn.      Follow-up in about 1 month (around 5/12/2018).

## 2018-05-04 ENCOUNTER — OFFICE VISIT (OUTPATIENT)
Dept: OBSTETRICS AND GYNECOLOGY | Facility: CLINIC | Age: 48
End: 2018-05-04
Payer: MEDICAID

## 2018-05-04 VITALS
DIASTOLIC BLOOD PRESSURE: 78 MMHG | SYSTOLIC BLOOD PRESSURE: 120 MMHG | WEIGHT: 194.88 LBS | BODY MASS INDEX: 39.36 KG/M2

## 2018-05-04 DIAGNOSIS — Z11.3 SCREEN FOR STD (SEXUALLY TRANSMITTED DISEASE): ICD-10-CM

## 2018-05-04 DIAGNOSIS — D25.9 UTERINE LEIOMYOMA, UNSPECIFIED LOCATION: Primary | ICD-10-CM

## 2018-05-04 DIAGNOSIS — A59.01 TRICHOMONAS VAGINITIS: ICD-10-CM

## 2018-05-04 PROCEDURE — 87491 CHLMYD TRACH DNA AMP PROBE: CPT

## 2018-05-04 PROCEDURE — 99213 OFFICE O/P EST LOW 20 MIN: CPT | Mod: S$PBB,,, | Performed by: OBSTETRICS & GYNECOLOGY

## 2018-05-04 PROCEDURE — 81002 URINALYSIS NONAUTO W/O SCOPE: CPT | Mod: PBBFAC,PO | Performed by: OBSTETRICS & GYNECOLOGY

## 2018-05-04 PROCEDURE — 87210 SMEAR WET MOUNT SALINE/INK: CPT | Mod: PBBFAC,PO | Performed by: OBSTETRICS & GYNECOLOGY

## 2018-05-04 PROCEDURE — 99212 OFFICE O/P EST SF 10 MIN: CPT | Mod: PBBFAC,PO | Performed by: OBSTETRICS & GYNECOLOGY

## 2018-05-04 PROCEDURE — 99999 PR PBB SHADOW E&M-EST. PATIENT-LVL II: CPT | Mod: PBBFAC,,, | Performed by: OBSTETRICS & GYNECOLOGY

## 2018-05-04 RX ORDER — METRONIDAZOLE 500 MG/1
500 TABLET ORAL 2 TIMES DAILY
Qty: 14 TABLET | Refills: 0 | Status: SHIPPED | OUTPATIENT
Start: 2018-05-04 | End: 2018-05-11

## 2018-05-04 RX ORDER — MELOXICAM 15 MG/1
15 TABLET ORAL DAILY
COMMUNITY
Start: 2018-05-03 | End: 2019-03-07

## 2018-05-04 RX ORDER — PREDNISOLONE ACETATE 10 MG/ML
SUSPENSION/ DROPS OPHTHALMIC
Refills: 4 | COMMUNITY
Start: 2018-04-12 | End: 2019-03-06

## 2018-05-04 RX ORDER — CIPROFLOXACIN HYDROCHLORIDE 3 MG/ML
SOLUTION/ DROPS OPHTHALMIC
Refills: 1 | COMMUNITY
Start: 2018-04-12 | End: 2019-03-07

## 2018-05-04 RX ADMIN — MEDROXYPROGESTERONE ACETATE 150 MG: 150 INJECTION, SUSPENSION INTRAMUSCULAR at 02:05

## 2018-05-04 NOTE — PROGRESS NOTES
Subjective:       Patient ID: Tara Encinas is a 47 y.o. female.    Chief Complaint:  Follow-up      History of Present Illness  HPI  Pt is here for follow up.  Reports that her bleeding has stopped completely on the Depo but still has pelvic discomforts.  Pt also reports complaints of vaginal discomfort, excessive genital/upper thigh moisture, and burning after urination.  Is due for next Depo.    GYN & OB History  No LMP recorded (lmp unknown).   Date of Last Pap: 2017    OB History    Para Term  AB Living   6 3 3   3 3   SAB TAB Ectopic Multiple Live Births     2 1   3      # Outcome Date GA Lbr Cameron/2nd Weight Sex Delivery Anes PTL Lv   6 Ectopic            5 Term      CS-Unspec      4 Term      CS-Unspec      3 TAB            2 Term      CS-Unspec      1 TAB                   Review of Systems  Review of Systems   Constitutional: Negative for activity change, appetite change, fatigue, fever and unexpected weight change.   Respiratory: Negative for shortness of breath.    Cardiovascular: Negative for chest pain.   Gastrointestinal: Negative for abdominal pain.   Genitourinary: Positive for dyspareunia, dysuria, pelvic pain, vaginal discharge, vaginal pain and vaginal odor. Negative for flank pain, frequency, genital sores, hematuria, menstrual problem, urgency and vaginal bleeding.   Musculoskeletal: Negative for back pain.   Neurological: Negative for syncope and headaches.           Objective:    Physical Exam:   Constitutional: She is oriented to person, place, and time. She appears well-developed and well-nourished. No distress.       Cardiovascular: Normal rate and regular rhythm.     Pulmonary/Chest: Effort normal.        Abdominal: Soft. Bowel sounds are normal. She exhibits no distension. There is no tenderness.     Genitourinary: Pelvic exam was performed with patient supine. There is no rash, tenderness, lesion or injury on the right labia. There is no rash, tenderness, lesion or  injury on the left labia. Uterus is enlarged. Uterus is not deviated and not tender. Cervix is normal. Right adnexum displays no mass, no tenderness and no fullness. Left adnexum displays no mass, no tenderness and no fullness. There is erythema and tenderness in the vagina. No bleeding in the vagina. No foreign body in the vagina. No signs of injury around the vagina. Vaginal discharge found. Cervix exhibits no motion tenderness, no discharge and no friability.   Genitourinary Comments: Wet prep: many trichomonads and moderate clue cells, negative for yeast  UA today negative           Musculoskeletal: Normal range of motion and moves all extremeties. She exhibits no edema or tenderness.       Neurological: She is alert and oriented to person, place, and time.    Skin: Skin is warm and dry.    Psychiatric: She has a normal mood and affect. Her behavior is normal. Thought content normal.          Assessment:        1. Uterine leiomyoma, unspecified location    2. Trichomonas vaginitis    3. Screen for STD (sexually transmitted disease)             Plan:      Uterine leiomyoma, unspecified location  -     Symptoms improving on Depo Provera, however, still experiencing discomforts.  Pt counseled on options.  Desires to continue with Depo use. Next dose today.    Trichomonas vaginitis  -     POCT Wet Prep  -     metroNIDAZOLE (FLAGYL) 500 MG tablet; Take 1 tablet (500 mg total) by mouth 2 (two) times daily.  Dispense: 14 tablet; Refill: 0  -     Pt counseled trichomonas.  Medication dosing, risks, side-effects, and interactions were discussed.  Refer partner for treatment and abstain from intercourse for 2-4 weeks after both parties complete treatment.    Screen for STD (sexually transmitted disease)  -     C. trachomatis/N. gonorrhoeae by AMP DNA Cervix      Follow-up in about 3 months (around 8/4/2018).

## 2018-05-04 NOTE — NURSING
Pt. Received depo provera injection today. Pt. Tolerated well. Instructed pt. To wait in clinic for 15 minutes. Made next appt. And gave copy of AVS.

## 2018-05-04 NOTE — PATIENT INSTRUCTIONS
Trichomonas Vaginal Infection (Trichomoniasis)    You have a Trichomonas vaginal infection. This problem is often called trich. It is caused by a parasite that is passed during sex. This makes trich a sexually transmitted disease (STD). Both men and women can get trich, but it is more common in women.  Most people who have trich dont have any symptoms at first. If symptoms do occur, they may take weeks or months to develop.  Symptoms in women can include:  · Thin discharge from the vagina that may smell bad and be clear, white, gray, green, or yellow in color  · Itching, burning, redness, or soreness in or around the vagina  · Pain in the lower belly  · Frequent urination or pain and burning during urination  · Pain during sex  Symptoms in men are not very common. Men may have trich and pass it to women during sex without knowing they were ever infected.  Trich is most often treated with antibiotics. Without treatment, trich can increase the risk of more serious health problems such as:  · Pelvic inflammatory disease (PID)  ·  delivery (giving birth to a baby early if youre pregnant)  · HIV and certain other sexually transmitted diseases (STDs)  Home care  · Take the antibiotics youre prescribed exactly as directed. Finish all of the medicine, even if your symptoms go away.  · Avoid drinking alcohol until youre done with your treatment.  · Tell any partners you have sex with that you have trich. They will need be tested for trich and possibly treated as well.  · Avoid having sex until you and any partners you have sex with are confirmed to no longer have trich.  Prevention  The only way to avoid getting trich or any other STD is to avoid having sex. If you choose to have sex, then take steps to lower your health risks:  · Use condoms when having sex.  · Limit the number of partners you have sex with.  · Get tested regularly for STDs. Ask any partner you have sex with to do the same.  · Dont have sex  with anyone who has symptoms that may be due to an STD.  Follow-up care  Follow up with your healthcare provider, or as advised. Testing will likely be done to ensure that the infection has cleared.  When to seek medical advice  Call your healthcare provider right away if:  · You have a fever of 100.4ºF (38ºC) or higher, or as directed by your provider.  · Your symptoms worsen, or they dont go away even after completing your treatment.  · You have new pain in the lower belly or pelvic region.  · You have side effects that bother you or a reaction to the medicine youre taking.  · You or any partners you have sex with have new symptoms, such as a rash, joint pain, or sores.  Date Last Reviewed: 7/30/2015  © 8196-3999 [employer]. [address] [place] [address], [place] [address]. All rights reserved. This information is not intended as a substitute for professional medical care. Always follow your healthcare professional's instructions.

## 2018-05-05 LAB
C TRACH DNA SPEC QL NAA+PROBE: NOT DETECTED
N GONORRHOEA DNA SPEC QL NAA+PROBE: NOT DETECTED

## 2018-08-02 ENCOUNTER — TELEPHONE (OUTPATIENT)
Dept: OBSTETRICS AND GYNECOLOGY | Facility: CLINIC | Age: 48
End: 2018-08-02

## 2018-08-02 NOTE — TELEPHONE ENCOUNTER
----- Message from Leni Hayward sent at 8/2/2018 10:11 AM CDT -----  Contact: pt   Call pt to get fitted in to see the doctor. Pt states that she over slept this morning and need to see the doctor at the [facility] Office.    [phone] (home)

## 2018-08-06 ENCOUNTER — OFFICE VISIT (OUTPATIENT)
Dept: OBSTETRICS AND GYNECOLOGY | Facility: CLINIC | Age: 48
End: 2018-08-06
Payer: MEDICAID

## 2018-08-06 VITALS
WEIGHT: 197.56 LBS | SYSTOLIC BLOOD PRESSURE: 126 MMHG | DIASTOLIC BLOOD PRESSURE: 78 MMHG | HEIGHT: 59 IN | BODY MASS INDEX: 39.83 KG/M2

## 2018-08-06 DIAGNOSIS — D25.9 UTERINE LEIOMYOMA, UNSPECIFIED LOCATION: Primary | ICD-10-CM

## 2018-08-06 PROCEDURE — 99212 OFFICE O/P EST SF 10 MIN: CPT | Mod: S$PBB,,, | Performed by: OBSTETRICS & GYNECOLOGY

## 2018-08-06 PROCEDURE — 99213 OFFICE O/P EST LOW 20 MIN: CPT | Mod: PBBFAC,PO | Performed by: OBSTETRICS & GYNECOLOGY

## 2018-08-06 PROCEDURE — 99999 PR PBB SHADOW E&M-EST. PATIENT-LVL III: CPT | Mod: PBBFAC,,, | Performed by: OBSTETRICS & GYNECOLOGY

## 2018-08-06 RX ADMIN — MEDROXYPROGESTERONE ACETATE 150 MG: 150 INJECTION, SUSPENSION INTRAMUSCULAR at 05:08

## 2018-08-06 NOTE — PROGRESS NOTES
Pt. Received depo provera today. Pt. Tolerated well. Instructed pt. To wait in clinic for 15 minutes. Pt. Voiced understanding. Made next appt.

## 2018-08-06 NOTE — PROGRESS NOTES
Subjective:       Patient ID: Tara Encinas is a 47 y.o. female.    Chief Complaint:  Contraception and Consult      History of Present Illness  HPI  Pt is here for follow up visit.  Pt reports that she is still eperiencing irregular bleeding on Depo.  This is causing disruption and pt would like to discuss options.  Pt would like to keep option open for pregnancy in future.    GYN & OB History  No LMP recorded.   Date of Last Pap: 2017    OB History    Para Term  AB Living   6 3 3   3 3   SAB TAB Ectopic Multiple Live Births     2 1   3      # Outcome Date GA Lbr Cameron/2nd Weight Sex Delivery Anes PTL Lv   6 Ectopic            5 Term      CS-Unspec      4 Term      CS-Unspec      3 TAB            2 Term      CS-Unspec      1 TAB                   Review of Systems  Review of Systems   Constitutional: Negative for activity change, appetite change, fatigue, fever and unexpected weight change.   Respiratory: Negative for shortness of breath.    Cardiovascular: Negative for chest pain.   Gastrointestinal: Negative for abdominal pain.   Genitourinary: Positive for menorrhagia and menstrual problem. Negative for dyspareunia, vaginal bleeding, vaginal discharge, vaginal pain, dysmenorrhea and vaginal odor.   Musculoskeletal: Negative for back pain.   Neurological: Negative for syncope and headaches.           Objective:    Physical Exam:   Constitutional: She is oriented to person, place, and time. She appears well-developed and well-nourished. No distress.                           Neurological: She is alert and oriented to person, place, and time.     Psychiatric: She has a normal mood and affect. Her behavior is normal. Thought content normal.          Assessment:        1. Uterine leiomyoma, unspecified location             Plan:      Uterine leiomyoma, unspecified location  -     US Pelvis Comp with Transvag NON-OB (xpd; Future; Expected date: 2018  -     Pt was counseled on fibroids,  including common signs and symptoms.  Symptoms are disruptive.  Pt would like to keep option for future childbearing.  Treatment options were reviewed with pt, including medical vs fibroid embolization vs myomectomy vs hysterectomy.  Pt voiced understanding and desires to proceed with Robotic Myomectomy.  Pt is aware of possible need to convert to open procedure and is aware of risk of recurrence and that there is no guarantee for future fertility.  Surgery details, indications, risks, and benefits were reviewed with pt.  Will have Yulisa contact pt to schedule Robotic Myomectomy.  Pt will continue with Bhargav[facility] in mean time.

## 2018-08-07 ENCOUNTER — TELEPHONE (OUTPATIENT)
Dept: OBSTETRICS AND GYNECOLOGY | Facility: CLINIC | Age: 48
End: 2018-08-07

## 2018-08-07 NOTE — TELEPHONE ENCOUNTER
----- Message from Obey Acosta MD sent at 8/6/2018  5:17 PM CDT -----  Please contact pt to schedule Robotic Myomectomy.  Thanks.

## 2018-08-07 NOTE — TELEPHONE ENCOUNTER
Unable to contact patient, her phone does not accept incoming phone calls.  Will wait for patient to contact office to schedule surgery.

## 2018-08-08 ENCOUNTER — TELEPHONE (OUTPATIENT)
Dept: OBSTETRICS AND GYNECOLOGY | Facility: CLINIC | Age: 48
End: 2018-08-08

## 2018-08-08 ENCOUNTER — TELEPHONE (OUTPATIENT)
Dept: RADIOLOGY | Facility: HOSPITAL | Age: 48
End: 2018-08-08

## 2018-08-08 NOTE — TELEPHONE ENCOUNTER
Patient states she wants to wait on surgery at this time.  She says Depo Provera is controlling the bleeding.  She will have the ultrasound and give it some time.  She will contact office if symptoms return or worsen.

## 2018-09-28 ENCOUNTER — TELEPHONE (OUTPATIENT)
Dept: RADIOLOGY | Facility: HOSPITAL | Age: 48
End: 2018-09-28

## 2018-10-31 ENCOUNTER — TELEPHONE (OUTPATIENT)
Dept: OBSTETRICS AND GYNECOLOGY | Facility: CLINIC | Age: 48
End: 2018-10-31

## 2018-10-31 NOTE — TELEPHONE ENCOUNTER
----- Message from Nelly Diego sent at 10/31/2018  2:20 PM CDT -----  Contact: pt  States she has a nurse visit today and she needs to reschedule for Monday morning. Please call pt at [phone]. Thank you

## 2018-10-31 NOTE — TELEPHONE ENCOUNTER
Rescheduled patient's depo nurse visit to 11/5/18 9:30am, estefany.  This is the last date of patient's date range.  She received her previous depo on 8/6/18.  Her date range was 10/22-11/5. Patient verbalized understanding of date, time, and location of appt change.

## 2018-11-05 ENCOUNTER — CLINICAL SUPPORT (OUTPATIENT)
Dept: OBSTETRICS AND GYNECOLOGY | Facility: CLINIC | Age: 48
End: 2018-11-05
Payer: MEDICAID

## 2018-11-05 DIAGNOSIS — Z30.42 ENCOUNTER FOR DEPO-PROVERA CONTRACEPTION: Primary | ICD-10-CM

## 2018-11-05 PROCEDURE — 96372 THER/PROPH/DIAG INJ SC/IM: CPT | Mod: PBBFAC,PO

## 2018-11-05 PROCEDURE — 99999 PR PBB SHADOW E&M-EST. PATIENT-LVL II: CPT | Mod: PBBFAC,,,

## 2018-11-05 PROCEDURE — 99212 OFFICE O/P EST SF 10 MIN: CPT | Mod: PBBFAC,PO

## 2018-11-05 RX ORDER — TETRACAINE HYDROCHLORIDE 5 MG/ML
1 SOLUTION OPHTHALMIC
COMMUNITY
Start: 2016-09-30 | End: 2019-03-06

## 2018-11-05 RX ORDER — LEVOCETIRIZINE DIHYDROCHLORIDE 5 MG/1
5 TABLET, FILM COATED ORAL
COMMUNITY
Start: 2018-10-27 | End: 2019-03-06

## 2018-11-05 RX ORDER — PREDNISONE 20 MG/1
TABLET ORAL
Refills: 0 | COMMUNITY
Start: 2018-10-04 | End: 2019-03-07

## 2018-11-05 RX ORDER — TROPICAMIDE 10 MG/ML
1 SOLUTION/ DROPS OPHTHALMIC
COMMUNITY
Start: 2016-09-30 | End: 2019-03-06

## 2018-11-05 RX ORDER — HYDROCODONE BITARTRATE AND ACETAMINOPHEN 5; 325 MG/1; MG/1
TABLET ORAL
Refills: 0 | COMMUNITY
Start: 2018-10-05 | End: 2019-03-06

## 2018-11-05 RX ORDER — FLUOCINONIDE 0.5 MG/G
CREAM TOPICAL
Refills: 1 | COMMUNITY
Start: 2018-10-04 | End: 2019-03-07

## 2018-11-05 RX ORDER — HYDROCODONE BITARTRATE AND ACETAMINOPHEN 7.5; 325 MG/1; MG/1
TABLET ORAL
Refills: 0 | COMMUNITY
Start: 2018-10-05 | End: 2019-03-07

## 2018-11-05 RX ORDER — CYCLOBENZAPRINE HCL 10 MG
TABLET ORAL
Refills: 0 | COMMUNITY
Start: 2018-10-01

## 2018-11-05 RX ADMIN — MEDROXYPROGESTERONE ACETATE 150 MG: 150 INJECTION, SUSPENSION INTRAMUSCULAR at 09:11

## 2018-11-05 NOTE — PROGRESS NOTES
Two patient identifiers verified  Patient notified to wait 15 minutes in clinic after injection, patient verbalized understanding.   150mg/ml of depo provera administered IM to Right ventrogluteal   Patient tolerated well  Next injection scheduled for 01/28/19 at 9:30am at the [facility] location.

## 2018-11-07 ENCOUNTER — TELEPHONE (OUTPATIENT)
Dept: RADIOLOGY | Facility: HOSPITAL | Age: 48
End: 2018-11-07

## 2018-11-08 ENCOUNTER — HOSPITAL ENCOUNTER (OUTPATIENT)
Dept: RADIOLOGY | Facility: HOSPITAL | Age: 48
Discharge: HOME | End: 2018-11-08
Attending: OBSTETRICS & GYNECOLOGY
Payer: MEDICAID

## 2018-11-08 ENCOUNTER — OFFICE VISIT (OUTPATIENT)
Dept: OBSTETRICS AND GYNECOLOGY | Facility: CLINIC | Age: 48
End: 2018-11-08
Payer: MEDICAID

## 2018-11-08 VITALS
HEIGHT: 59 IN | BODY MASS INDEX: 40.57 KG/M2 | WEIGHT: 201.25 LBS | SYSTOLIC BLOOD PRESSURE: 124 MMHG | DIASTOLIC BLOOD PRESSURE: 86 MMHG

## 2018-11-08 DIAGNOSIS — B35.9 TINEA: ICD-10-CM

## 2018-11-08 DIAGNOSIS — N76.0 BV (BACTERIAL VAGINOSIS): Primary | ICD-10-CM

## 2018-11-08 DIAGNOSIS — D25.9 UTERINE LEIOMYOMA, UNSPECIFIED LOCATION: ICD-10-CM

## 2018-11-08 DIAGNOSIS — Z11.3 SCREEN FOR STD (SEXUALLY TRANSMITTED DISEASE): ICD-10-CM

## 2018-11-08 DIAGNOSIS — B96.89 BV (BACTERIAL VAGINOSIS): Primary | ICD-10-CM

## 2018-11-08 PROCEDURE — 99999 PR PBB SHADOW E&M-EST. PATIENT-LVL III: CPT | Mod: PBBFAC,,, | Performed by: NURSE PRACTITIONER

## 2018-11-08 PROCEDURE — 87491 CHLMYD TRACH DNA AMP PROBE: CPT

## 2018-11-08 PROCEDURE — 99214 OFFICE O/P EST MOD 30 MIN: CPT | Mod: S$PBB,,, | Performed by: NURSE PRACTITIONER

## 2018-11-08 PROCEDURE — 99213 OFFICE O/P EST LOW 20 MIN: CPT | Mod: PBBFAC,25,PO | Performed by: NURSE PRACTITIONER

## 2018-11-08 PROCEDURE — 76830 TRANSVAGINAL US NON-OB: CPT | Mod: 26,,, | Performed by: RADIOLOGY

## 2018-11-08 PROCEDURE — 87210 SMEAR WET MOUNT SALINE/INK: CPT | Mod: PBBFAC,PO | Performed by: NURSE PRACTITIONER

## 2018-11-08 PROCEDURE — 76856 US EXAM PELVIC COMPLETE: CPT | Mod: 26,,, | Performed by: RADIOLOGY

## 2018-11-08 PROCEDURE — 76856 US EXAM PELVIC COMPLETE: CPT | Mod: TC,PO

## 2018-11-08 PROCEDURE — 76830 TRANSVAGINAL US NON-OB: CPT | Mod: TC,PO

## 2018-11-08 RX ORDER — METRONIDAZOLE 500 MG/1
500 TABLET ORAL EVERY 12 HOURS
Qty: 14 TABLET | Refills: 0 | Status: SHIPPED | OUTPATIENT
Start: 2018-11-08 | End: 2018-11-15

## 2018-11-08 RX ORDER — NYSTATIN 100000 [USP'U]/G
POWDER TOPICAL
Qty: 60 G | Refills: 1 | Status: SHIPPED | OUTPATIENT
Start: 2018-11-08 | End: 2019-02-01

## 2018-11-08 NOTE — PROGRESS NOTES
"Tara Encinas is a 48 y.o. female  presents with complaint itching to bilateral groins cream given by Acosta not helping.  Constant vaginal bleeding due to fibroids always has vaginal odor. Having repeat u/s with Acosta.   Wants full std workup even though done in recent past.     Past Medical History:   Diagnosis Date    Asthma     Tubal ectopic pregnancy      Past Surgical History:   Procedure Laterality Date     SECTION      x 3    ECTOPIC PREGNANCY SURGERY      EYE SURGERY      Left eye    FINGER SURGERY      right hand (index finger)    KNEE SURGERY      bilateral     Social History     Tobacco Use    Smoking status: Never Smoker    Smokeless tobacco: Never Used   Substance Use Topics    Alcohol use: Yes     Comment: socially    Drug use: No     Family History   Problem Relation Age of Onset    Diabetes Paternal Grandmother     Breast cancer Neg Hx     Colon cancer Neg Hx     Ovarian cancer Neg Hx     Thrombosis Neg Hx      OB History    Para Term  AB Living   6 3 3   3 3   SAB TAB Ectopic Multiple Live Births     2 1   3      # Outcome Date GA Lbr Cameron/2nd Weight Sex Delivery Anes PTL Lv   6 Ectopic            5 Term      CS-Unspec      4 Term      CS-Unspec      3 TAB            2 Term      CS-Unspec      1 TAB                   /86   Ht 4' 11" (1.499 m)   Wt 91.3 kg (201 lb 4.5 oz)   BMI 40.65 kg/m²     ROS:  GENERAL: No fever, chills, fatigability or weight loss.  VULVAR: No pain, no lesions and no itching.  VAGINAL: No relaxation, no itching, no discharge, no abnormal bleeding and no lesions.  ABDOMEN: No abdominal pain. Denies nausea. Denies vomiting. No diarrhea. No constipation  BREAST: Denies pain. No lumps. No discharge.  URINARY: No incontinence, no nocturia, no frequency and no dysuria.  CARDIOVASCULAR: No chest pain. No shortness of breath. No leg cramps.  NEUROLOGICAL: No headaches. No vision changes.    PHYSICAL EXAM:  VULVA: tinea changes " to bilateral groin, VAGINA: vaginal discharge - thick dark brownish, WET MOUNT done - results: clue cells    ASSESSMENT and PLAN:  1. BV (bacterial vaginosis)  POCT Wet Prep    metroNIDAZOLE (FLAGYL) 500 MG tablet   2. Tinea  nystatin (MYCOSTATIN) powder       Patient was counseled today on vaginitis prevention including :  a. avoiding feminine products such as deoderant soaps, body wash, bubble bath, douches, scented toilet paper, deoderant tampons or pads, feminine wipes, chronic pad use, etc.  b. avoiding other vulvovaginal irritants such as long hot baths, humidity, tight, synthetic clothing, chlorine and sitting around in wet bathing suits  c. wearing cotton underwear, avoiding thong underwear and no underwear to bed  d. taking showers instead of baths and use a hair dryer on cool setting afterwards to dry  e. wearing cotton to exercise and shower immediately after exercise and change clothes  f. using polyurethane condoms without spermicide if sexually active and symptoms are triggered by intercourse

## 2018-11-09 ENCOUNTER — PATIENT MESSAGE (OUTPATIENT)
Dept: OBSTETRICS AND GYNECOLOGY | Facility: CLINIC | Age: 48
End: 2018-11-09

## 2018-11-09 LAB
C TRACH DNA SPEC QL NAA+PROBE: NOT DETECTED
N GONORRHOEA DNA SPEC QL NAA+PROBE: NOT DETECTED

## 2018-11-12 ENCOUNTER — PATIENT MESSAGE (OUTPATIENT)
Dept: OBSTETRICS AND GYNECOLOGY | Facility: CLINIC | Age: 48
End: 2018-11-12

## 2019-01-29 ENCOUNTER — TELEPHONE (OUTPATIENT)
Dept: OBSTETRICS AND GYNECOLOGY | Facility: CLINIC | Age: 49
End: 2019-01-29

## 2019-01-29 NOTE — TELEPHONE ENCOUNTER
----- Message from Delgado Vera sent at 1/29/2019  3:12 PM CST -----  Contact: pt   Pt would like to xander a visit to discuss getting surgery. Pt also would like to lori shot for same day. pls return call.         ..[phone] (home)

## 2019-01-29 NOTE — TELEPHONE ENCOUNTER
----- Message from Delgado Vera sent at 1/29/2019  3:12 PM CST -----  Contact: pt   Pt would like to xander a visit to discuss getting surgery. Pt also would like to lori shot for same day. pls return call.         ..[phone] (home)

## 2019-01-29 NOTE — TELEPHONE ENCOUNTER
Spoke to patient and scheduled her appointment for 02/01/19 at 11:30am at the [facility] location to see Dr. Acosta. Patient verbalized understanding.

## 2019-02-01 ENCOUNTER — OFFICE VISIT (OUTPATIENT)
Dept: OBSTETRICS AND GYNECOLOGY | Facility: CLINIC | Age: 49
End: 2019-02-01
Payer: MEDICAID

## 2019-02-01 ENCOUNTER — TELEPHONE (OUTPATIENT)
Dept: OBSTETRICS AND GYNECOLOGY | Facility: CLINIC | Age: 49
End: 2019-02-01

## 2019-02-01 VITALS
WEIGHT: 214.06 LBS | SYSTOLIC BLOOD PRESSURE: 130 MMHG | HEIGHT: 59 IN | BODY MASS INDEX: 43.16 KG/M2 | DIASTOLIC BLOOD PRESSURE: 70 MMHG

## 2019-02-01 DIAGNOSIS — D25.9 UTERINE LEIOMYOMA, UNSPECIFIED LOCATION: Primary | ICD-10-CM

## 2019-02-01 PROCEDURE — 99212 OFFICE O/P EST SF 10 MIN: CPT | Mod: PBBFAC,PN,25 | Performed by: OBSTETRICS & GYNECOLOGY

## 2019-02-01 PROCEDURE — 99999 PR PBB SHADOW E&M-EST. PATIENT-LVL II: CPT | Mod: PBBFAC,,, | Performed by: OBSTETRICS & GYNECOLOGY

## 2019-02-01 PROCEDURE — 99999 PR PBB SHADOW E&M-EST. PATIENT-LVL II: ICD-10-PCS | Mod: PBBFAC,,, | Performed by: OBSTETRICS & GYNECOLOGY

## 2019-02-01 PROCEDURE — 96372 THER/PROPH/DIAG INJ SC/IM: CPT | Mod: PBBFAC,PN

## 2019-02-01 PROCEDURE — 99212 OFFICE O/P EST SF 10 MIN: CPT | Mod: S$PBB,,, | Performed by: OBSTETRICS & GYNECOLOGY

## 2019-02-01 PROCEDURE — 99212 PR OFFICE/OUTPT VISIT, EST, LEVL II, 10-19 MIN: ICD-10-PCS | Mod: S$PBB,,, | Performed by: OBSTETRICS & GYNECOLOGY

## 2019-02-01 RX ADMIN — MEDROXYPROGESTERONE ACETATE 150 MG: 150 INJECTION, SUSPENSION INTRAMUSCULAR at 01:02

## 2019-02-01 NOTE — PROGRESS NOTES
Subjective:       Patient ID: Tara Encinas is a 48 y.o. female.    Chief Complaint:  Consult (surgery for fibroids)      History of Present Illness  HPI  Pt is here for follow up.  Reports having worsening symptoms (pain and discomfort) despite the Depo Provera and now desires to proceed with surgery.  Pt remains most interested in myomectomy.    GYN & OB History  No LMP recorded. Patient is not currently having periods (Reason: Birth Control).   Date of Last Pap: 2017    OB History    Para Term  AB Living   6 3 3   3 3   SAB TAB Ectopic Multiple Live Births     2 1   3      # Outcome Date GA Lbr Cameron/2nd Weight Sex Delivery Anes PTL Lv   6 Ectopic            5 Term      CS-Unspec      4 Term      CS-Unspec      3 TAB            2 Term      CS-Unspec      1 TAB                   Review of Systems  Review of Systems   Constitutional: Positive for fatigue. Negative for activity change, appetite change, fever and unexpected weight change.   Respiratory: Negative for shortness of breath.    Cardiovascular: Negative for chest pain, palpitations and leg swelling.   Gastrointestinal: Positive for abdominal pain.   Genitourinary: Positive for pelvic pain. Negative for vaginal bleeding, vaginal discharge, vaginal pain and vaginal odor.   Musculoskeletal: Positive for back pain.           Objective:    Physical Exam:   Constitutional: She is oriented to person, place, and time. She appears well-developed and well-nourished. No distress.                           Neurological: She is alert and oriented to person, place, and time.     Psychiatric: She has a normal mood and affect. Her behavior is normal. Thought content normal.          Assessment:        1. Uterine leiomyoma, unspecified location             Plan:      Uterine leiomyoma, unspecified location  -     Pt was again counseled on fibroids, including common signs and symptoms.  Symptoms are disruptive and have worsened despite Depo Provera.   Pt would like to keep option for future childbearing, although she realizes risks associated with pregnancy at age 47 yo.  Treatment options were again reviewed with pt, including medical vs fibroid embolization vs myomectomy vs hysterectomy.  Pt voiced understanding and desires to proceed with Robotic Myomectomy.  Pt is aware of possible need to convert to open procedure and is aware of risk of recurrence and that there is no guarantee for future fertility.  Surgery details, indications, risks, and benefits were reviewed with pt.  Will have Yulisa contact pt to schedule Robotic Myomectomy.  Pt will continue with [place] in mean time.

## 2019-02-01 NOTE — PROGRESS NOTES
Pt identified using 2 identifiers, name and . Pt agreed to depo injection. Given in L ventrogluteal. Pt tolerated without complaints. Pt instructed to wait 15 min to monitor for S&S, verbalized understanding. No S&S noted at this time.     SAILAJA Main LPN

## 2019-03-06 ENCOUNTER — OFFICE VISIT (OUTPATIENT)
Dept: OBSTETRICS AND GYNECOLOGY | Facility: CLINIC | Age: 49
End: 2019-03-06
Payer: MEDICAID

## 2019-03-06 ENCOUNTER — HOSPITAL ENCOUNTER (OUTPATIENT)
Dept: PREADMISSION TESTING | Facility: HOSPITAL | Age: 49
Discharge: HOME | End: 2019-03-06
Attending: OBSTETRICS & GYNECOLOGY
Payer: MEDICAID

## 2019-03-06 VITALS
HEART RATE: 82 BPM | WEIGHT: 215 LBS | RESPIRATION RATE: 20 BRPM | BODY MASS INDEX: 43.34 KG/M2 | HEIGHT: 59 IN | DIASTOLIC BLOOD PRESSURE: 90 MMHG | TEMPERATURE: 98 F | SYSTOLIC BLOOD PRESSURE: 145 MMHG

## 2019-03-06 VITALS — BODY MASS INDEX: 43.42 KG/M2 | HEIGHT: 59 IN | WEIGHT: 215.38 LBS

## 2019-03-06 DIAGNOSIS — D25.9 UTERINE LEIOMYOMA, UNSPECIFIED LOCATION: Primary | ICD-10-CM

## 2019-03-06 PROCEDURE — 99499 UNLISTED E&M SERVICE: CPT | Mod: S$PBB,,, | Performed by: OBSTETRICS & GYNECOLOGY

## 2019-03-06 PROCEDURE — 99212 OFFICE O/P EST SF 10 MIN: CPT | Mod: PBBFAC | Performed by: OBSTETRICS & GYNECOLOGY

## 2019-03-06 PROCEDURE — 99999 PR PBB SHADOW E&M-EST. PATIENT-LVL II: ICD-10-PCS | Mod: PBBFAC,,, | Performed by: OBSTETRICS & GYNECOLOGY

## 2019-03-06 PROCEDURE — 99999 PR PBB SHADOW E&M-EST. PATIENT-LVL II: CPT | Mod: PBBFAC,,, | Performed by: OBSTETRICS & GYNECOLOGY

## 2019-03-06 PROCEDURE — 99499 NO LOS: ICD-10-PCS | Mod: S$PBB,,, | Performed by: OBSTETRICS & GYNECOLOGY

## 2019-03-06 NOTE — PROGRESS NOTES
Pt is here for pre-operative visit.  Pt reports no complaints.  Ready for surgery.    ROS Negative     Physical Exam:  See H+P    A/P:  Uterine leiomyoma, unspecified location  -     Procedure risks, benefits, and alternatives were discussed.  In particular, risk of fibroid and/or symptom persistence and recurrence was discussed.  Pt voiced understanding and expressed consent for Robotic Myomectomy.  Hospital forms were reviewed with pt and signed.  Pre-operative instructions were given.

## 2019-03-06 NOTE — DISCHARGE INSTRUCTIONS
To confirm, Your doctor has instructed you that surgery is scheduled for 03/11/19 at  07:00 a.m.       Please report to [facility] [address], 1st floor, main lobby by 05:30 a.m.  Pre admit office will call afternoon prior to surgery with final arrival time    INSTRUCTIONS IMPORTANT!!!   Do not eat, drink, or smoke after 12 midnight-including water. OK to brush teeth, no gum, candy or mints!    ¨ Take only these medicines with a small swallow of water-morning of surgery.  Prednisone        Pre operative instructions:  Please review the Pre-Operative Instruction booklet that you were given.        Bathing Instructions--See page 6 in the Pre-operative booklet.      Prevention of surgical site infections:     -Keep incisions clean and dry.   -Do not soak/submerge incisions in water until completely healed.   -Do not apply lotions, powders, creams, or deodorants to site.   -Always make sure hands are cleaned with antibacterial soap/ alcohol-based                 prior to touching the surgical site.  (This includes doctors,                 nurses, staff, and yourself.)    Signs and symptoms:   -Redness and pain around the area where you had surgery   -Drainage of cloudy fluid from your surgical wound   -Fever over 100.4       I have read or had read and explained to me, and understand the above information.  Additional comments or instructions:  Received a copy of Pre-operative instructions booklet, FAQ surgical site infection sheet, and packets of hibiclens (if indicated).

## 2019-03-06 NOTE — H&P (VIEW-ONLY)
[facility]  Obstetrics & Gynecology  History & Physical    Patient Name: Tara Encinas  MRN: [id number]  Admission Date: (Not on file)  Primary Care Provider: Primary Doctor No    Subjective:     Chief Complaint/Reason for Admission: surgery    History of Present Illness:   49 yo  with symptomatic fibroids is here for scheduled surgery.  Pt reports no new complaints and is ready for surgery.    Current Outpatient Medications on File Prior to Visit   Medication Sig    ciprofloxacin HCl (CILOXAN) 0.3 % ophthalmic solution INSTILL 1 GTT INTO OS QID    cyclobenzaprine (FLEXERIL) 10 MG tablet TK 1 T PO QD PRN    fluocinonide 0.05% (LIDEX) 0.05 % cream APPLY TO THE AA BID FOR 7 TO 10 DAYS    HYDROcodone-acetaminophen (NORCO) 5-325 mg per tablet TK 1 T PO  Q 6 H PRN    HYDROcodone-acetaminophen (NORCO) 7.5-325 mg per tablet TK 1 T PO Q 6 TO 8 H    ketorolac (TORADOL) 10 mg tablet     levocetirizine (XYZAL) 5 MG tablet     meloxicam (MOBIC) 15 MG tablet     naproxen sodium (ANAPROX) 550 MG tablet Take 1 tablet (550 mg total) by mouth 2 (two) times daily with meals.    phenylephrine HCL 2.5% (MYDFRIN) 2.5 % ophthalmic solution Apply 1 drop to eye.    prednisoLONE acetate (PRED FORTE) 1 % DrpS INSTILL 1 GTT INTO OS Q 2 H WHILE AWAKE    predniSONE (DELTASONE) 20 MG tablet TK 1 T PO  ONCE DAILY FOR 5 DAYS    tetracaine HCL 0.5% (PONTOCAINE) 0.5 % ophthalmic solution Apply 1 drop to eye.    tropicamide 1% (MYDRIACYL) 1 % Drop Apply 1 drop to eye.     No current facility-administered medications on file prior to visit.        Review of patient's allergies indicates:  No Known Allergies    Past Medical History:   Diagnosis Date    Asthma     Tubal ectopic pregnancy      OB History    Para Term  AB Living   6 3 3   3 3   SAB TAB Ectopic Multiple Live Births     2 1   3      # Outcome Date GA Lbr Cameron/2nd Weight Sex Delivery Anes PTL Lv   6 Ectopic            5 Term      CS-Unspec      4  Term      CS-Unspec      3 TAB            2 Term      CS-Unspec      1 TAB                 Past Surgical History:   Procedure Laterality Date     SECTION      x 3    ECTOPIC PREGNANCY SURGERY      EYE SURGERY      Left eye    FINGER SURGERY      right hand (index finger)    KNEE SURGERY      bilateral     Family History     Problem Relation (Age of Onset)    Diabetes Paternal Grandmother        Tobacco Use    Smoking status: Never Smoker    Smokeless tobacco: Never Used   Substance and Sexual Activity    Alcohol use: Yes     Comment: socially    Drug use: No    Sexual activity: Yes     Partners: Male     Birth control/protection: None, Injection     Review of Systems   Constitutional: Positive for fatigue. Negative for activity change, appetite change, fever and unexpected weight change.   Respiratory: Negative for shortness of breath.    Cardiovascular: Negative for chest pain, palpitations and leg swelling.   Gastrointestinal: Positive for abdominal pain. Negative for bloating, blood in stool, constipation, diarrhea, nausea and vomiting.   Genitourinary: Positive for pelvic pain. Negative for dysmenorrhea, dyspareunia, dysuria, flank pain, frequency, genital sores, hematuria, menorrhagia, menstrual problem, urgency, vaginal bleeding, vaginal discharge, vaginal pain, urinary incontinence, vaginal dryness and vaginal odor.   Musculoskeletal: Positive for back pain.   Neurological: Negative for syncope and headaches.     Objective:     Vital Signs (Most Recent):    Vital Signs (24h Range):  [unfilled]        There is no height or weight on file to calculate BMI.  No LMP recorded. Patient is not currently having periods (Reason: Birth Control).    Physical Exam:   Constitutional: She is oriented to person, place, and time. She appears well-developed and well-nourished. No distress.    HENT:   Head: Normocephalic and atraumatic.    Eyes: EOM are normal. Pupils are equal, round, and reactive to light.     Neck: Normal range of motion.    Cardiovascular: Normal rate, regular rhythm and normal heart sounds.     Pulmonary/Chest: Effort normal and breath sounds normal.        Abdominal: Soft. Bowel sounds are normal. She exhibits no distension. There is no tenderness.             Musculoskeletal: Normal range of motion and moves all extremeties. She exhibits no edema or tenderness.       Neurological: She is alert and oriented to person, place, and time.    Skin: Skin is warm and dry.    Psychiatric: She has a normal mood and affect. Her behavior is normal. Thought content normal.       Laboratory:  I have personallly reviewed all pertinent lab results from the last 24 hours.    Diagnostic Results:  Labs: Reviewed  US: Reviewed  Pap reviewed    Assessment/Plan:     There are no hospital problems to display for this patient.    Uterine leiomyoma, unspecified location  -     Procedure risks, benefits, and alternatives were discussed.  In particular, risk of fibroid and/or symptom persistence and recurrence was discussed.  Pt voiced understanding and expressed consent for Robotic Myomectomy.  Hospital forms were reviewed with pt and signed.  Pre-operative instructions were given.      Obey Acosta MD  Obstetrics & Gynecology  [facility]

## 2019-03-06 NOTE — H&P
[facility]  Obstetrics & Gynecology  History & Physical    Patient Name: Tara Encinas  MRN: [id number]  Admission Date: (Not on file)  Primary Care Provider: Primary Doctor No    Subjective:     Chief Complaint/Reason for Admission: surgery    History of Present Illness:   47 yo  with symptomatic fibroids is here for scheduled surgery.  Pt reports no new complaints and is ready for surgery.    Current Outpatient Medications on File Prior to Visit   Medication Sig    ciprofloxacin HCl (CILOXAN) 0.3 % ophthalmic solution INSTILL 1 GTT INTO OS QID    cyclobenzaprine (FLEXERIL) 10 MG tablet TK 1 T PO QD PRN    fluocinonide 0.05% (LIDEX) 0.05 % cream APPLY TO THE AA BID FOR 7 TO 10 DAYS    HYDROcodone-acetaminophen (NORCO) 5-325 mg per tablet TK 1 T PO  Q 6 H PRN    HYDROcodone-acetaminophen (NORCO) 7.5-325 mg per tablet TK 1 T PO Q 6 TO 8 H    ketorolac (TORADOL) 10 mg tablet     levocetirizine (XYZAL) 5 MG tablet     meloxicam (MOBIC) 15 MG tablet     naproxen sodium (ANAPROX) 550 MG tablet Take 1 tablet (550 mg total) by mouth 2 (two) times daily with meals.    phenylephrine HCL 2.5% (MYDFRIN) 2.5 % ophthalmic solution Apply 1 drop to eye.    prednisoLONE acetate (PRED FORTE) 1 % DrpS INSTILL 1 GTT INTO OS Q 2 H WHILE AWAKE    predniSONE (DELTASONE) 20 MG tablet TK 1 T PO  ONCE DAILY FOR 5 DAYS    tetracaine HCL 0.5% (PONTOCAINE) 0.5 % ophthalmic solution Apply 1 drop to eye.    tropicamide 1% (MYDRIACYL) 1 % Drop Apply 1 drop to eye.     No current facility-administered medications on file prior to visit.        Review of patient's allergies indicates:  No Known Allergies    Past Medical History:   Diagnosis Date    Asthma     Tubal ectopic pregnancy      OB History    Para Term  AB Living   6 3 3   3 3   SAB TAB Ectopic Multiple Live Births     2 1   3      # Outcome Date GA Lbr Cameron/2nd Weight Sex Delivery Anes PTL Lv   6 Ectopic            5 Term      CS-Unspec      4  Term      CS-Unspec      3 TAB            2 Term      CS-Unspec      1 TAB                 Past Surgical History:   Procedure Laterality Date     SECTION      x 3    ECTOPIC PREGNANCY SURGERY      EYE SURGERY      Left eye    FINGER SURGERY      right hand (index finger)    KNEE SURGERY      bilateral     Family History     Problem Relation (Age of Onset)    Diabetes Paternal Grandmother        Tobacco Use    Smoking status: Never Smoker    Smokeless tobacco: Never Used   Substance and Sexual Activity    Alcohol use: Yes     Comment: socially    Drug use: No    Sexual activity: Yes     Partners: Male     Birth control/protection: None, Injection     Review of Systems   Constitutional: Positive for fatigue. Negative for activity change, appetite change, fever and unexpected weight change.   Respiratory: Negative for shortness of breath.    Cardiovascular: Negative for chest pain, palpitations and leg swelling.   Gastrointestinal: Positive for abdominal pain. Negative for bloating, blood in stool, constipation, diarrhea, nausea and vomiting.   Genitourinary: Positive for pelvic pain. Negative for dysmenorrhea, dyspareunia, dysuria, flank pain, frequency, genital sores, hematuria, menorrhagia, menstrual problem, urgency, vaginal bleeding, vaginal discharge, vaginal pain, urinary incontinence, vaginal dryness and vaginal odor.   Musculoskeletal: Positive for back pain.   Neurological: Negative for syncope and headaches.     Objective:     Vital Signs (Most Recent):    Vital Signs (24h Range):  [unfilled]        There is no height or weight on file to calculate BMI.  No LMP recorded. Patient is not currently having periods (Reason: Birth Control).    Physical Exam:   Constitutional: She is oriented to person, place, and time. She appears well-developed and well-nourished. No distress.    HENT:   Head: Normocephalic and atraumatic.    Eyes: EOM are normal. Pupils are equal, round, and reactive to light.     Neck: Normal range of motion.    Cardiovascular: Normal rate, regular rhythm and normal heart sounds.     Pulmonary/Chest: Effort normal and breath sounds normal.        Abdominal: Soft. Bowel sounds are normal. She exhibits no distension. There is no tenderness.             Musculoskeletal: Normal range of motion and moves all extremeties. She exhibits no edema or tenderness.       Neurological: She is alert and oriented to person, place, and time.    Skin: Skin is warm and dry.    Psychiatric: She has a normal mood and affect. Her behavior is normal. Thought content normal.       Laboratory:  I have personallly reviewed all pertinent lab results from the last 24 hours.    Diagnostic Results:  Labs: Reviewed  US: Reviewed  Pap reviewed    Assessment/Plan:     There are no hospital problems to display for this patient.    Uterine leiomyoma, unspecified location  -     Procedure risks, benefits, and alternatives were discussed.  In particular, risk of fibroid and/or symptom persistence and recurrence was discussed.  Pt voiced understanding and expressed consent for Robotic Myomectomy.  Hospital forms were reviewed with pt and signed.  Pre-operative instructions were given.      Obey Acosta MD  Obstetrics & Gynecology  [facility]

## 2019-03-07 RX ORDER — CIPROFLOXACIN HYDROCHLORIDE 3 MG/ML
1 SOLUTION/ DROPS OPHTHALMIC 4 TIMES DAILY PRN
COMMUNITY

## 2019-03-07 RX ORDER — METHYLPREDNISOLONE 4 MG/1
4 TABLET ORAL
COMMUNITY

## 2019-03-11 ENCOUNTER — HOSPITAL ENCOUNTER (OUTPATIENT)
Facility: HOSPITAL | Age: 49
Discharge: HOME | End: 2019-03-11
Attending: OBSTETRICS & GYNECOLOGY | Admitting: OBSTETRICS & GYNECOLOGY
Payer: MEDICAID

## 2019-03-11 VITALS
TEMPERATURE: 98 F | OXYGEN SATURATION: 98 % | RESPIRATION RATE: 18 BRPM | DIASTOLIC BLOOD PRESSURE: 91 MMHG | HEART RATE: 74 BPM | HEIGHT: 59 IN | BODY MASS INDEX: 41.87 KG/M2 | WEIGHT: 207.69 LBS | SYSTOLIC BLOOD PRESSURE: 175 MMHG

## 2019-03-11 DIAGNOSIS — D25.9 UTERINE LEIOMYOMA, UNSPECIFIED LOCATION: ICD-10-CM

## 2019-03-11 LAB
B-HCG UR QL: NEGATIVE
CTP QC/QA: YES

## 2019-03-11 PROCEDURE — 81025 URINE PREGNANCY TEST: CPT | Performed by: OBSTETRICS & GYNECOLOGY

## 2019-03-11 RX ORDER — CEFAZOLIN SODIUM 2 G/50ML
2 SOLUTION INTRAVENOUS
Status: DISCONTINUED | OUTPATIENT
Start: 2019-03-11 | End: 2019-03-11 | Stop reason: HOSPADM

## 2019-03-11 RX ORDER — SODIUM CHLORIDE, SODIUM LACTATE, POTASSIUM CHLORIDE, CALCIUM CHLORIDE 600; 310; 30; 20 MG/100ML; MG/100ML; MG/100ML; MG/100ML
INJECTION, SOLUTION INTRAVENOUS CONTINUOUS
Status: DISCONTINUED | OUTPATIENT
Start: 2019-03-11 | End: 2019-03-11 | Stop reason: HOSPADM

## 2019-03-11 NOTE — INTERVAL H&P NOTE
The patient has been examined and the H&P has been reviewed:    I concur with the findings and changes have been noted since the H&P was written: Pt reports recent and sudden worsening of her asthma to the point of excessive nausea and vomiting (unable to tolerate food).    Anesthesia/Surgery risks, benefits and alternative options discussed and understood by patient/family.    Due to recent Asthma exacerbation, pt was advised to postpone surgery.  Pt also advised to follow up with PCP/Urgent Care to evaluate her asthma regimen for possible adjustments.  Pt will call back to reschedule her surgery once she has been cleared.      Active Hospital Problems    Diagnosis  POA    Uterine leiomyoma [D25.9]  Yes      Resolved Hospital Problems   No resolved problems to display.

## 2019-03-11 NOTE — PLAN OF CARE
Pt to Preop Area coughing frequently non Productive. Pt states its her asthma. Pt been having diarrhea and vomiting since Thursday, States every time she eats she throws it up. Unsure of what is wrong. DR Londono notified at this time of pts condition.  [id number] Dr Londono here to see pt  Surgery cancelled. Er called for status at pts request 1-2 wait time.Pt states she will follow up with her Primary or go to [facility].DR Londono at bedside speaking with Patient about her care Prior to rescheduling her surgery, Pt verbalizes Understanding.

## 2019-04-02 ENCOUNTER — TELEPHONE (OUTPATIENT)
Dept: OBSTETRICS AND GYNECOLOGY | Facility: CLINIC | Age: 49
End: 2019-04-02

## 2019-04-02 NOTE — TELEPHONE ENCOUNTER
----- Message from Kendra Carrion sent at 4/1/2019  4:12 PM CDT -----  Contact: [phone]  Would like to consult with the nurse, patient would like to know if her daughter can be seen and would like to speak  with the nurse concerning this, please call back at [phone], thank darien

## 2019-04-03 ENCOUNTER — TELEPHONE (OUTPATIENT)
Dept: OBSTETRICS AND GYNECOLOGY | Facility: CLINIC | Age: 49
End: 2019-04-03

## 2019-04-03 NOTE — TELEPHONE ENCOUNTER
Patient called regarding her daughter.  Patient wants to get her daughter in to see ESTER Arrington for birth control.  Patient's daughter is 15.  CHERYL Charles stated that ESTER Arrington will see a 15 year old.  Informed patient to call back and speak to the call center to set up an account for her daughter, and that they can schedule her with ESTER Arrington.  Patient verbalized understanding.

## 2019-04-03 NOTE — TELEPHONE ENCOUNTER
----- Message from Indu Bazzi sent at 4/3/2019  2:50 PM CDT -----  Contact: Patient  Type:  Patient Returning Call    Who Called: Tara  Who Left Message for Patient: the nurse  Does the patient know what this is regarding?: n/a  Would the patient rather a call back or a response via [employer]?  Call back   Best Call Back Number: [phone]  Additional Information: n/a    Inud Sanchez

## 2019-05-06 ENCOUNTER — TELEPHONE (OUTPATIENT)
Dept: OBSTETRICS AND GYNECOLOGY | Facility: CLINIC | Age: 49
End: 2019-05-06

## 2019-05-06 NOTE — TELEPHONE ENCOUNTER
Patient called to schedule her depo appointment. Her last depo was given on 02/01/19. Her dates to receive the depo were 04/19/19-05/03/19. Let her know that I will forward this message to Dr. Acosta to see if patient needs to do a UPT or blood hcg prior to scheduling. Patient verbalized understanding.     Please advise.

## 2019-05-06 NOTE — TELEPHONE ENCOUNTER
Spoke to patient and let her know she would have to do a UPT at her depo appointment. Scheduled patient for 05/08/19 at 10:00am at the [facility] location. Patient verbalized understanding.

## 2019-05-08 ENCOUNTER — TELEPHONE (OUTPATIENT)
Dept: OBSTETRICS AND GYNECOLOGY | Facility: CLINIC | Age: 49
End: 2019-05-08

## 2019-05-08 NOTE — TELEPHONE ENCOUNTER
----- Message from Nelly Diego sent at 5/8/2019  3:12 PM CDT -----  Contact: pt  States she needs to reschedule her nurse visit for tomorrow. Please call pt at [phone]. Thank you

## 2019-05-09 ENCOUNTER — CLINICAL SUPPORT (OUTPATIENT)
Dept: OBSTETRICS AND GYNECOLOGY | Facility: CLINIC | Age: 49
End: 2019-05-09
Payer: MEDICAID

## 2019-05-09 DIAGNOSIS — Z30.42 ENCOUNTER FOR DEPO-PROVERA CONTRACEPTION: Primary | ICD-10-CM

## 2019-05-09 LAB
B-HCG UR QL: NEGATIVE
CTP QC/QA: YES

## 2019-05-09 PROCEDURE — 99211 OFF/OP EST MAY X REQ PHY/QHP: CPT | Mod: PBBFAC

## 2019-05-09 PROCEDURE — 81025 URINE PREGNANCY TEST: CPT | Mod: PBBFAC

## 2019-05-09 PROCEDURE — 96372 THER/PROPH/DIAG INJ SC/IM: CPT | Mod: PBBFAC

## 2019-05-09 PROCEDURE — 99999 PR PBB SHADOW E&M-EST. PATIENT-LVL I: ICD-10-PCS | Mod: PBBFAC,,,

## 2019-05-09 PROCEDURE — 99999 PR PBB SHADOW E&M-EST. PATIENT-LVL I: CPT | Mod: PBBFAC,,,

## 2019-05-09 RX ORDER — MEDROXYPROGESTERONE ACETATE 150 MG/ML
150 INJECTION, SUSPENSION INTRAMUSCULAR
Status: ACTIVE | OUTPATIENT
Start: 2019-05-09 | End: 2020-08-01

## 2019-05-09 RX ADMIN — MEDROXYPROGESTERONE ACETATE 150 MG: 150 INJECTION, SUSPENSION, EXTENDED RELEASE INTRAMUSCULAR at 08:05

## 2019-08-02 ENCOUNTER — CLINICAL SUPPORT (OUTPATIENT)
Dept: OBSTETRICS AND GYNECOLOGY | Facility: CLINIC | Age: 49
End: 2019-08-02
Payer: MEDICAID

## 2019-08-02 PROCEDURE — 99999 PR PBB SHADOW E&M-EST. PATIENT-LVL I: ICD-10-PCS | Mod: PBBFAC,,,

## 2019-08-02 PROCEDURE — 99211 OFF/OP EST MAY X REQ PHY/QHP: CPT | Mod: PBBFAC

## 2019-08-02 PROCEDURE — 99999 PR PBB SHADOW E&M-EST. PATIENT-LVL I: CPT | Mod: PBBFAC,,,

## 2019-08-02 PROCEDURE — 96372 THER/PROPH/DIAG INJ SC/IM: CPT | Mod: PBBFAC

## 2019-08-02 RX ADMIN — MEDROXYPROGESTERONE ACETATE 150 MG: 150 INJECTION, SUSPENSION, EXTENDED RELEASE INTRAMUSCULAR at 11:08

## 2019-08-02 NOTE — PROGRESS NOTES
Verified pt with two identifiers name and . Allergies and medications reviewed. Depo provera 150 mg/ml administered IM to right ventrogluteal while using aseptic technique. No discomfort noted. Pt tolerated well. Advised pt to wait 15 minutes in the lobby to monitor for side effects. Next scheduled injection 10-18-19 @ 1030 at the [facility].  Pt verbalized understanding.

## 2019-11-20 ENCOUNTER — TELEPHONE (OUTPATIENT)
Dept: OBSTETRICS AND GYNECOLOGY | Facility: CLINIC | Age: 49
End: 2019-11-20

## 2019-11-20 DIAGNOSIS — Z30.42 ENCOUNTER FOR DEPO-PROVERA CONTRACEPTION: Primary | ICD-10-CM

## 2019-11-20 NOTE — TELEPHONE ENCOUNTER
----- Message from Alfred Temple sent at 11/20/2019  9:21 AM CST -----  Contact: self  Requesting call back regarding pt getting depo shot. Please call back at [phone].    Thanks,  Alfred Temple

## (undated) DEVICE — POSITIONER HEAD DONUT 9IN FOAM

## (undated) DEVICE — SEE MEDLINE ITEM 154981

## (undated) DEVICE — SUT ABS CLIP LAPRA-TY CTD

## (undated) DEVICE — Device

## (undated) DEVICE — SEE MEDLINE ITEM 157027

## (undated) DEVICE — OBTURATOR BLADELESS 8MM XI CLR

## (undated) DEVICE — SOL ELECTROLUBE ANTI-STIC

## (undated) DEVICE — SOL NS 1000CC

## (undated) DEVICE — SEE MEDLINE ITEM 152622

## (undated) DEVICE — NDL PNEUMO INSUFFLATI 120MM

## (undated) DEVICE — SOL 9P NACL IRR PIC IL

## (undated) DEVICE — GLOVE 8 PROTEXIS PI BLUE

## (undated) DEVICE — SEE MEDLINE ITEM 157181

## (undated) DEVICE — ADHESIVE DERMABOND ADVANCED

## (undated) DEVICE — SYR 3CC LUER LOC

## (undated) DEVICE — SEE MEDLINE ITEM 146292

## (undated) DEVICE — SYR 50CC LL

## (undated) DEVICE — EVACUATOR KIT SMOKE PLUME AWAY

## (undated) DEVICE — TUBING HEATED INSUFFLATOR

## (undated) DEVICE — SEAL UNIVERSAL 5MM-8MM XI

## (undated) DEVICE — GLOVE PROTEXIS HYDROGEL SZ6.5

## (undated) DEVICE — DRAPE LAVH LAPAROSCOPY W/FLUID

## (undated) DEVICE — GOWN SURG 2XL DISP TIE BACK

## (undated) DEVICE — KIT ANTIFOG

## (undated) DEVICE — COVER OVERHEAD SURG LT BLUE

## (undated) DEVICE — APPLICATOR CHLORAPREP ORN 26ML

## (undated) DEVICE — DRAPE COLUMN DAVINCI XI

## (undated) DEVICE — GLOVE PROTEXIS HYDROGEL SZ7

## (undated) DEVICE — DRAPE ARM DAVINCI XI

## (undated) DEVICE — DRAPE STERI LONG

## (undated) DEVICE — COVER TIP CURVED SCISSORS XI

## (undated) DEVICE — SEE MEDLINE ITEM 157117

## (undated) DEVICE — GLOVE PROTEXIS HYDROGEL SZ7.5

## (undated) DEVICE — SYR 10CC LUER LOCK

## (undated) DEVICE — OCCLUDER COLPO-PNEUMO STERILE

## (undated) DEVICE — SUT MONOCRYL 4-0 PS-1 UND

## (undated) DEVICE — ELECTRODE REM PLYHSV RETURN 9

## (undated) DEVICE — SUPPORT ULNA NERVE PROTECTOR